# Patient Record
Sex: MALE | Race: BLACK OR AFRICAN AMERICAN | NOT HISPANIC OR LATINO | URBAN - METROPOLITAN AREA
[De-identification: names, ages, dates, MRNs, and addresses within clinical notes are randomized per-mention and may not be internally consistent; named-entity substitution may affect disease eponyms.]

---

## 2017-07-14 ENCOUNTER — OFFICE (AMBULATORY)
Dept: URBAN - METROPOLITAN AREA CLINIC 64 | Facility: CLINIC | Age: 57
End: 2017-07-14
Payer: COMMERCIAL

## 2017-07-14 ENCOUNTER — ON CAMPUS - OUTPATIENT (AMBULATORY)
Dept: URBAN - METROPOLITAN AREA HOSPITAL 2 | Facility: HOSPITAL | Age: 57
End: 2017-07-14
Payer: COMMERCIAL

## 2017-07-14 VITALS
HEART RATE: 76 BPM | SYSTOLIC BLOOD PRESSURE: 94 MMHG | DIASTOLIC BLOOD PRESSURE: 77 MMHG | OXYGEN SATURATION: 100 % | HEART RATE: 68 BPM | SYSTOLIC BLOOD PRESSURE: 107 MMHG | HEART RATE: 65 BPM | TEMPERATURE: 98 F | HEART RATE: 61 BPM | HEIGHT: 69 IN | OXYGEN SATURATION: 97 % | SYSTOLIC BLOOD PRESSURE: 128 MMHG | OXYGEN SATURATION: 96 % | HEART RATE: 60 BPM | DIASTOLIC BLOOD PRESSURE: 64 MMHG | RESPIRATION RATE: 18 BRPM | DIASTOLIC BLOOD PRESSURE: 63 MMHG | DIASTOLIC BLOOD PRESSURE: 78 MMHG | HEART RATE: 55 BPM | OXYGEN SATURATION: 98 % | DIASTOLIC BLOOD PRESSURE: 69 MMHG | DIASTOLIC BLOOD PRESSURE: 72 MMHG | SYSTOLIC BLOOD PRESSURE: 97 MMHG | OXYGEN SATURATION: 99 % | SYSTOLIC BLOOD PRESSURE: 108 MMHG | HEART RATE: 72 BPM | SYSTOLIC BLOOD PRESSURE: 101 MMHG | SYSTOLIC BLOOD PRESSURE: 110 MMHG | WEIGHT: 150 LBS | DIASTOLIC BLOOD PRESSURE: 67 MMHG | HEART RATE: 67 BPM | DIASTOLIC BLOOD PRESSURE: 76 MMHG | RESPIRATION RATE: 16 BRPM | DIASTOLIC BLOOD PRESSURE: 81 MMHG | SYSTOLIC BLOOD PRESSURE: 95 MMHG | HEART RATE: 89 BPM

## 2017-07-14 DIAGNOSIS — K63.5 POLYP OF COLON: ICD-10-CM

## 2017-07-14 DIAGNOSIS — Z86.010 PERSONAL HISTORY OF COLONIC POLYPS: ICD-10-CM

## 2017-07-14 DIAGNOSIS — K57.30 DIVERTICULOSIS OF LARGE INTESTINE WITHOUT PERFORATION OR ABS: ICD-10-CM

## 2017-07-14 PROBLEM — D12.5 BENIGN NEOPLASM OF SIGMOID COLON: Status: ACTIVE | Noted: 2017-07-14

## 2017-07-14 LAB
GI HISTOLOGY: A. UNSPECIFIED: (no result)
GI HISTOLOGY: PDF REPORT: (no result)

## 2017-07-14 PROCEDURE — 88305 TISSUE EXAM BY PATHOLOGIST: CPT | Performed by: INTERNAL MEDICINE

## 2017-07-14 PROCEDURE — 45385 COLONOSCOPY W/LESION REMOVAL: CPT | Mod: 33 | Performed by: INTERNAL MEDICINE

## 2017-07-14 RX ADMIN — PROPOFOL: 10 INJECTION, EMULSION INTRAVENOUS at 11:24

## 2022-06-06 ENCOUNTER — HOSPITAL ENCOUNTER (OUTPATIENT)
Dept: OTHER | Facility: HOSPITAL | Age: 62
Discharge: HOME | End: 2022-06-06

## 2022-09-12 NOTE — PROGRESS NOTES
REFERRING PROVIDER:    Stephania Andrade, DARELL  [address] [place] [address]    REASON FOR CONSULTATION: Normocytic anemia    HISTORY OF PRESENT ILLNESS:  Yazan Sheriff is a 62 y.o. male who is referred today for further evaluation of normocytic anemia.  He is here today for a second opinion.    Labs on 7/27/2022: Creatinine 1.30.  Vitamin B12 265, ferritin 90, hemoglobin 8.2 with hematocrit 25.4%.  MCV 94.4.  White blood cells 4.4 and platelets 264,000.  Reticulocytes 2.2%.    History of recurrent iron deficiency anemia followed by Dr. Jesse Oviedo in [place].  He has required intravenous iron.  Also history of IgG lambda monoclonal gammopathy.  The M spike in May 2020 was 1 g/dL.  This had increased from 0.5 g from February 2019.  Concern for GI bleeding with negative endoscopy.  He is anticoagulated with Eliquis 5 mg twice daily for cardiac reasons.  He had a Mediport placed in January 2022.    He states a couple of months ago he had an EGD and colonoscopy in [place] that did not show source of bleeding.  He notes dark stool but not melenic stool.    He states that this issue has been going on for several years.  He states he required multiple IV iron infusions over the past few months as prescribed by Dr. Oviedo.    He states he has not had his small bowel evaluated.  He denies any other bleeding.  He notes ongoing fatigue and tiredness which does improve after IV iron infusions.  He did get a Mediport due to poor venous access and he is glad he did this.    Occasionally he has some left chest pain when he is more anemic.  Otherwise he remains active.  He hunts and fishes.    No family history of blood disorders.  He denies any other hematologic issues.  He denies HIV risk factors.    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       Past Surgical History:   Procedure Laterality Date   • CARDIAC CATHETERIZATION  06/2021    with coil placement   • CARPAL TUNNEL RELEASE Right    • CHOLECYSTECTOMY     • COLONOSCOPY  12/2018    Benign per patient.   • ENDOSCOPY     • LEG REVASCULARIZATION Right 04/2021    Right lower extremity revascularization   • MEDIPORT INSERTION, SINGLE  01/2022   • OTHER SURGICAL HISTORY  08/2020    Stenting aortic arch for aortic aneurysm and thrombosed celiac artery aneurysm   • OTHER SURGICAL HISTORY  02/2020    Saphenous harvest and stent placement in thoracic aorta   • ROTATOR CUFF REPAIR Right    • VASCULAR SURGERY Right 2020    Right lower extremity vascular surgery revascularization from compartment syndrome       SOCIAL HISTORY:   reports that he quit smoking about 2 years ago. He smoked 0.50 packs per day. He has never used smokeless tobacco. He reports that he does not drink alcohol. No history on file for drug use.    FAMILY HISTORY:  family history includes Colon cancer in his mother; Stomach cancer in his father; Stroke in his mother.    ALLERGIES:  No Known Allergies    MEDICATIONS:  The medication list has been reviewed with the patient by the medical assistant, and the list has been updated in the electronic medical record, which I reviewed.  Medication dosages and frequencies were confirmed to be accurate.    Review of Systems   Constitutional: Positive for fatigue.   All other systems reviewed and are negative.      PHYSICAL EXAMINATION:  Vitals:    09/15/22 0853   BP: 161/95   Pulse: 63   Resp: 16   Temp: 97.8 °F (36.6 °C)   TempSrc: Temporal   SpO2: 100%   Weight: 63.3 kg (139 lb 8 oz)   Height: 176.5 cm (69.5\")   PainSc: 0-No pain       Physical " Exam  Vitals and nursing note reviewed.   Constitutional:       General: He is not in acute distress.     Appearance: He is well-developed.   HENT:      Head: Normocephalic and atraumatic. Hair is normal.   Eyes:      General: Lids are normal.      Conjunctiva/sclera: Conjunctivae normal.      Pupils: Pupils are equal.   Neck:      Thyroid: No thyroid mass or thyromegaly.      Vascular: No JVD.   Cardiovascular:      Rate and Rhythm: Normal rate and regular rhythm.      Heart sounds: No murmur heard.    No friction rub. No gallop.   Pulmonary:      Effort: Pulmonary effort is normal. No respiratory distress.      Breath sounds: Normal breath sounds. No wheezing or rales.   Chest:   Breasts:      Right: No supraclavicular adenopathy.      Left: No supraclavicular adenopathy.        Comments: Benign-appearing Mediport present in the right subclavian area  Abdominal:      General: There is no distension.      Palpations: Abdomen is soft. There is no hepatomegaly, splenomegaly or mass.      Tenderness: There is no abdominal tenderness. There is no guarding.   Musculoskeletal:         General: No tenderness or deformity. Normal range of motion.      Cervical back: Neck supple.   Lymphadenopathy:      Cervical: No cervical adenopathy.      Upper Body:      Right upper body: No supraclavicular adenopathy.      Left upper body: No supraclavicular adenopathy.   Skin:     General: Skin is warm and dry.      Findings: No rash.   Neurological:      Mental Status: He is alert and oriented to person, place, and time.   Psychiatric:         Behavior: Behavior normal.         Thought Content: Thought content normal.         Judgment: Judgment normal.         DIAGNOSTIC DATA:  CBC & Differential (09/15/2022 08:48)      IMAGING:    I personally reviewed the peripheral blood smear.  Platelets adequate in number normal morphology.  Anisocytosis and poikilocytosis present.  Occasional schistocytes.  No obvious white cell  abnormalities.    ASSESSMENT:  This is a 62 y.o. male with:    *Normocytic anemia, apparent iron refractory iron deficiency anemia  · CBC dating back to 12/31/2014 with a white blood cell count of 7.29, hemoglobin 12.5, platelets 252,000  · Labs on 7/27/2022: Creatinine 1.30.  Vitamin B12 265, ferritin 90, hemoglobin 8.2 with hematocrit 25.4%.  MCV 94.4.  White blood cells 4.4 and platelets 264,000.  Reticulocytes 2.2%.  · 9/15/2022: Initial consultation.  Hemoglobin 8.7 with hematocrit 26.7%.  MCV normal at 96.  Normal white blood cell count of 4.94 with mild lymphocytopenia with an absolute lymphocyte count of 0.54.  Platelets normal at 227,000.  · Followed by Dr. Jesse Oviedo in [place].  He has required multiple IV iron infusions and his anemia has been refractory to this.  · Recent EGD and colonoscopy apparently without a source of bleeding.  He does have dark/black stool but not melenic stool.  Suspicion for GI blood loss.  · He states that he has not had a small bowel evaluated  · He may have some chronic kidney disease which is contributing as well.    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 Laboratory evaluation today for both anemia and cytopenias noted below.  We will access his Mediport for the labs today.  2. I will request EGD and colonoscopy records from past couple of months.  It does not sound like he has had a small bowel evaluated.  Therefore, refer to gastroenterology in [place] for capsule endoscopy.  3. I do not  think his renal function is poor enough to consider Procrit  4. The only other thing to do to evaluate the anemia would be to consider a bone marrow aspiration and biopsy  5. For now, I will follow-up laboratory results.  He plans to follow-up with Dr. Jesse Oviedo in [place] next month.    ORDERS PLACED DURING THIS ENCOUNTER  Orders Placed This Encounter   Procedures   • Comprehensive Metabolic Panel     Order Specific Question:   Release to patient     Answer:   Routine Release   • Ferritin     Order Specific Question:   Is the patient on iron therapy?     Answer:   Yes     Order Specific Question:   Release to patient     Answer:   Routine Release   • Iron Profile     Order Specific Question:   Is the patient on iron therapy?     Answer:   Yes     Order Specific Question:   Release to patient     Answer:   Routine Release   • Retic With IRF & RET-He     Order Specific Question:   Release to patient     Answer:   Routine Release   • GILBERTO,PE and FLC, Serum     Order Specific Question:   Release to patient     Answer:   Routine Release   • Vitamin B12     Order Specific Question:   Release to patient     Answer:   Routine Release   • Folate RBC     Order Specific Question:   Release to patient     Answer:   Routine Release   • Flow Cytometry, Blood     Order Specific Question:   Release to patient     Answer:   Routine Release   • HIV-1 / O / 2 Ag / Antibody 4th Generation     Order Specific Question:   Release to patient     Answer:   Routine Release   • Lactate Dehydrogenase     Order Specific Question:   Release to patient     Answer:   Routine Release   • Sedimentation Rate     Order Specific Question:   Release to patient     Answer:   Routine Release   • Erythropoietin     Order Specific Question:   Release to patient     Answer:   Routine Release   • Haptoglobin     Order Specific Question:   Release to patient     Answer:   Routine Release   • Folate     Order Specific Question:   Release to patient      Answer:   Routine Release   • Direct Antiglobulin Test (Direct Yosvany)     Order Specific Question:   Release to patient     Answer:   Routine Release   • CBC & Differential     Standing Status:   Future     Number of Occurrences:   1     Standing Expiration Date:   9/12/2023     Order Specific Question:   Manual Differential     Answer:   No     Order Specific Question:   Release to patient     Answer:   Routine Release

## 2022-09-15 ENCOUNTER — CONSULT (OUTPATIENT)
Dept: ONCOLOGY | Facility: CLINIC | Age: 62
End: 2022-09-15

## 2022-09-15 ENCOUNTER — INFUSION (OUTPATIENT)
Dept: ONCOLOGY | Facility: HOSPITAL | Age: 62
End: 2022-09-15

## 2022-09-15 ENCOUNTER — LAB (OUTPATIENT)
Dept: LAB | Facility: HOSPITAL | Age: 62
End: 2022-09-15

## 2022-09-15 VITALS
RESPIRATION RATE: 16 BRPM | DIASTOLIC BLOOD PRESSURE: 95 MMHG | HEART RATE: 63 BPM | OXYGEN SATURATION: 100 % | TEMPERATURE: 97.8 F | HEIGHT: 70 IN | SYSTOLIC BLOOD PRESSURE: 161 MMHG | BODY MASS INDEX: 19.97 KG/M2 | WEIGHT: 139.5 LBS

## 2022-09-15 DIAGNOSIS — D50.9 IRON DEFICIENCY ANEMIA, UNSPECIFIED IRON DEFICIENCY ANEMIA TYPE: ICD-10-CM

## 2022-09-15 DIAGNOSIS — Z45.2 FITTING AND ADJUSTMENT OF VASCULAR CATHETER: Primary | ICD-10-CM

## 2022-09-15 DIAGNOSIS — D50.9 IRON DEFICIENCY ANEMIA, UNSPECIFIED IRON DEFICIENCY ANEMIA TYPE: Primary | ICD-10-CM

## 2022-09-15 DIAGNOSIS — D72.810 LYMPHOCYTOPENIA: ICD-10-CM

## 2022-09-15 LAB
ALBUMIN SERPL-MCNC: 4.3 G/DL (ref 3.5–5.2)
ALBUMIN/GLOB SERPL: 1.4 G/DL (ref 1.1–2.4)
ALP SERPL-CCNC: 77 U/L (ref 38–116)
ALT SERPL W P-5'-P-CCNC: 9 U/L (ref 0–41)
ANION GAP SERPL CALCULATED.3IONS-SCNC: 11.4 MMOL/L (ref 5–15)
AST SERPL-CCNC: 15 U/L (ref 0–40)
BASOPHILS # BLD AUTO: 0.02 10*3/MM3 (ref 0–0.2)
BASOPHILS NFR BLD AUTO: 0.4 % (ref 0–1.5)
BILIRUB SERPL-MCNC: <0.2 MG/DL (ref 0.2–1.2)
BUN SERPL-MCNC: 21 MG/DL (ref 6–20)
BUN/CREAT SERPL: 15.1 (ref 7.3–30)
CALCIUM SPEC-SCNC: 9.4 MG/DL (ref 8.5–10.2)
CHLORIDE SERPL-SCNC: 107 MMOL/L (ref 98–107)
CO2 SERPL-SCNC: 23.6 MMOL/L (ref 22–29)
CREAT SERPL-MCNC: 1.39 MG/DL (ref 0.7–1.3)
DAT POLY-SP REAG RBC QL: NEGATIVE
DEPRECATED RDW RBC AUTO: 50.8 FL (ref 37–54)
EGFRCR SERPLBLD CKD-EPI 2021: 57 ML/MIN/1.73
EOSINOPHIL # BLD AUTO: 0.07 10*3/MM3 (ref 0–0.4)
EOSINOPHIL NFR BLD AUTO: 1.4 % (ref 0.3–6.2)
ERYTHROCYTE [DISTWIDTH] IN BLOOD BY AUTOMATED COUNT: 14.5 % (ref 12.3–15.4)
ERYTHROCYTE [SEDIMENTATION RATE] IN BLOOD: 17 MM/HR (ref 0–20)
FERRITIN SERPL-MCNC: 153.3 NG/ML (ref 30–400)
FOLATE SERPL-MCNC: 5.3 NG/ML (ref 4.78–24.2)
GLOBULIN UR ELPH-MCNC: 3 GM/DL (ref 1.8–3.5)
GLUCOSE SERPL-MCNC: 108 MG/DL (ref 74–124)
HAPTOGLOB SERPL-MCNC: 105 MG/DL (ref 30–200)
HCT VFR BLD AUTO: 26.7 % (ref 37.5–51)
HGB BLD-MCNC: 8.7 G/DL (ref 13–17.7)
HGB RETIC QN AUTO: 32.5 PG (ref 29.8–36.1)
HIV1+2 AB SER QL: NORMAL
IMM GRANULOCYTES # BLD AUTO: 0.03 10*3/MM3 (ref 0–0.05)
IMM GRANULOCYTES NFR BLD AUTO: 0.6 % (ref 0–0.5)
IMM RETICS NFR: 19.7 % (ref 3–15.8)
IRON 24H UR-MRATE: 51 MCG/DL (ref 59–158)
IRON SATN MFR SERPL: 18 % (ref 14–48)
LDH SERPL-CCNC: 159 U/L (ref 99–259)
LYMPHOCYTES # BLD AUTO: 0.54 10*3/MM3 (ref 0.7–3.1)
LYMPHOCYTES NFR BLD AUTO: 10.9 % (ref 19.6–45.3)
MCH RBC QN AUTO: 31.3 PG (ref 26.6–33)
MCHC RBC AUTO-ENTMCNC: 32.6 G/DL (ref 31.5–35.7)
MCV RBC AUTO: 96 FL (ref 79–97)
MONOCYTES # BLD AUTO: 0.26 10*3/MM3 (ref 0.1–0.9)
MONOCYTES NFR BLD AUTO: 5.3 % (ref 5–12)
NEUTROPHILS NFR BLD AUTO: 4.02 10*3/MM3 (ref 1.7–7)
NEUTROPHILS NFR BLD AUTO: 81.4 % (ref 42.7–76)
NRBC BLD AUTO-RTO: 0 /100 WBC (ref 0–0.2)
PLATELET # BLD AUTO: 227 10*3/MM3 (ref 140–450)
PMV BLD AUTO: 10.4 FL (ref 6–12)
POTASSIUM SERPL-SCNC: 4.4 MMOL/L (ref 3.5–4.7)
PROT SERPL-MCNC: 7.3 G/DL (ref 6.3–8)
RBC # BLD AUTO: 2.78 10*6/MM3 (ref 4.14–5.8)
RETICS # AUTO: 0.1 10*6/MM3 (ref 0.02–0.13)
RETICS/RBC NFR AUTO: 3.68 % (ref 0.7–1.9)
SODIUM SERPL-SCNC: 142 MMOL/L (ref 134–145)
TIBC SERPL-MCNC: 276 MCG/DL (ref 249–505)
TRANSFERRIN SERPL-MCNC: 197 MG/DL (ref 200–360)
VIT B12 BLD-MCNC: 265 PG/ML (ref 211–946)
WBC NRBC COR # BLD: 4.94 10*3/MM3 (ref 3.4–10.8)

## 2022-09-15 PROCEDURE — 82746 ASSAY OF FOLIC ACID SERUM: CPT | Performed by: INTERNAL MEDICINE

## 2022-09-15 PROCEDURE — 83540 ASSAY OF IRON: CPT | Performed by: INTERNAL MEDICINE

## 2022-09-15 PROCEDURE — 96523 IRRIG DRUG DELIVERY DEVICE: CPT

## 2022-09-15 PROCEDURE — 85025 COMPLETE CBC W/AUTO DIFF WBC: CPT

## 2022-09-15 PROCEDURE — 36415 COLL VENOUS BLD VENIPUNCTURE: CPT

## 2022-09-15 PROCEDURE — 99204 OFFICE O/P NEW MOD 45 MIN: CPT | Performed by: INTERNAL MEDICINE

## 2022-09-15 PROCEDURE — 85046 RETICYTE/HGB CONCENTRATE: CPT | Performed by: INTERNAL MEDICINE

## 2022-09-15 PROCEDURE — 83615 LACTATE (LD) (LDH) ENZYME: CPT | Performed by: INTERNAL MEDICINE

## 2022-09-15 PROCEDURE — 82728 ASSAY OF FERRITIN: CPT | Performed by: INTERNAL MEDICINE

## 2022-09-15 PROCEDURE — 25010000002 HEPARIN LOCK FLUSH PER 10 UNITS: Performed by: INTERNAL MEDICINE

## 2022-09-15 PROCEDURE — 84466 ASSAY OF TRANSFERRIN: CPT | Performed by: INTERNAL MEDICINE

## 2022-09-15 PROCEDURE — 82607 VITAMIN B-12: CPT | Performed by: INTERNAL MEDICINE

## 2022-09-15 PROCEDURE — G0432 EIA HIV-1/HIV-2 SCREEN: HCPCS | Performed by: INTERNAL MEDICINE

## 2022-09-15 PROCEDURE — 80053 COMPREHEN METABOLIC PANEL: CPT | Performed by: INTERNAL MEDICINE

## 2022-09-15 PROCEDURE — 86880 COOMBS TEST DIRECT: CPT | Performed by: INTERNAL MEDICINE

## 2022-09-15 PROCEDURE — 88182 CELL MARKER STUDY: CPT

## 2022-09-15 PROCEDURE — 83010 ASSAY OF HAPTOGLOBIN QUANT: CPT | Performed by: INTERNAL MEDICINE

## 2022-09-15 PROCEDURE — 85652 RBC SED RATE AUTOMATED: CPT | Performed by: INTERNAL MEDICINE

## 2022-09-15 PROCEDURE — 36591 DRAW BLOOD OFF VENOUS DEVICE: CPT

## 2022-09-15 PROCEDURE — 88185 FLOWCYTOMETRY/TC ADD-ON: CPT

## 2022-09-15 PROCEDURE — 88184 FLOWCYTOMETRY/ TC 1 MARKER: CPT

## 2022-09-15 RX ORDER — SODIUM CHLORIDE 0.9 % (FLUSH) 0.9 %
10 SYRINGE (ML) INJECTION AS NEEDED
Status: CANCELLED | OUTPATIENT
Start: 2022-09-15

## 2022-09-15 RX ORDER — TERAZOSIN 5 MG/1
5 CAPSULE ORAL DAILY
COMMUNITY
Start: 2022-07-15

## 2022-09-15 RX ORDER — HEPARIN SODIUM (PORCINE) LOCK FLUSH IV SOLN 100 UNIT/ML 100 UNIT/ML
500 SOLUTION INTRAVENOUS AS NEEDED
Status: CANCELLED | OUTPATIENT
Start: 2022-09-15

## 2022-09-15 RX ORDER — METHYLPREDNISOLONE 4 MG/1
TABLET ORAL
COMMUNITY
Start: 2022-09-14 | End: 2022-10-26

## 2022-09-15 RX ORDER — ASPIRIN 81 MG/1
81 TABLET, CHEWABLE ORAL DAILY
COMMUNITY

## 2022-09-15 RX ORDER — HYDROCODONE BITARTRATE AND ACETAMINOPHEN 10; 325 MG/1; MG/1
1 TABLET ORAL EVERY 4 HOURS PRN
COMMUNITY
Start: 2022-08-25

## 2022-09-15 RX ORDER — LISINOPRIL AND HYDROCHLOROTHIAZIDE 20; 12.5 MG/1; MG/1
1 TABLET ORAL DAILY
COMMUNITY
Start: 2013-12-09

## 2022-09-15 RX ORDER — AMLODIPINE BESYLATE 5 MG/1
5 TABLET ORAL DAILY
COMMUNITY
Start: 2022-07-21

## 2022-09-15 RX ORDER — GABAPENTIN 300 MG/1
300 CAPSULE ORAL AS NEEDED
COMMUNITY
Start: 2014-01-21

## 2022-09-15 RX ORDER — LOSARTAN POTASSIUM 100 MG/1
100 TABLET ORAL DAILY
COMMUNITY
Start: 2022-07-18

## 2022-09-15 RX ORDER — PANTOPRAZOLE SODIUM 20 MG/1
20 TABLET, DELAYED RELEASE ORAL DAILY
COMMUNITY
End: 2023-03-27

## 2022-09-15 RX ORDER — SODIUM CHLORIDE 0.9 % (FLUSH) 0.9 %
10 SYRINGE (ML) INJECTION AS NEEDED
OUTPATIENT
Start: 2022-09-15

## 2022-09-15 RX ORDER — HEPARIN SODIUM (PORCINE) LOCK FLUSH IV SOLN 100 UNIT/ML 100 UNIT/ML
500 SOLUTION INTRAVENOUS AS NEEDED
OUTPATIENT
Start: 2022-09-15

## 2022-09-15 RX ORDER — SODIUM CHLORIDE 0.9 % (FLUSH) 0.9 %
10 SYRINGE (ML) INJECTION AS NEEDED
Status: DISCONTINUED | OUTPATIENT
Start: 2022-09-15 | End: 2022-09-15 | Stop reason: HOSPADM

## 2022-09-15 RX ORDER — HEPARIN SODIUM (PORCINE) LOCK FLUSH IV SOLN 100 UNIT/ML 100 UNIT/ML
500 SOLUTION INTRAVENOUS AS NEEDED
Status: DISCONTINUED | OUTPATIENT
Start: 2022-09-15 | End: 2022-09-15 | Stop reason: HOSPADM

## 2022-09-15 RX ORDER — APIXABAN 5 MG/1
5 TABLET, FILM COATED ORAL 2 TIMES DAILY
COMMUNITY
Start: 2022-08-01

## 2022-09-15 RX ADMIN — Medication 10 ML: at 10:12

## 2022-09-15 RX ADMIN — Medication 500 UNITS: at 10:12

## 2022-09-15 NOTE — PROGRESS NOTES
Labs drawn venipuncture. Per Dr. Dow patient can get port flush today and draw add on labs from port.

## 2022-09-16 LAB
ALBUMIN SERPL ELPH-MCNC: 3.8 G/DL (ref 2.9–4.4)
ALBUMIN/GLOB SERPL: 1.3 {RATIO} (ref 0.7–1.7)
ALPHA1 GLOB SERPL ELPH-MCNC: 0.3 G/DL (ref 0–0.4)
ALPHA2 GLOB SERPL ELPH-MCNC: 0.6 G/DL (ref 0.4–1)
B-GLOBULIN SERPL ELPH-MCNC: 0.9 G/DL (ref 0.7–1.3)
EPO SERPL-ACNC: 62.1 MIU/ML (ref 2.6–18.5)
GAMMA GLOB SERPL ELPH-MCNC: 1.2 G/DL (ref 0.4–1.8)
GLOBULIN SER-MCNC: 3 G/DL (ref 2.2–3.9)
IGA SERPL-MCNC: 225 MG/DL (ref 61–437)
IGG SERPL-MCNC: 1195 MG/DL (ref 603–1613)
IGM SERPL-MCNC: 25 MG/DL (ref 20–172)
INTERPRETATION SERPL IEP-IMP: ABNORMAL
KAPPA LC FREE SER-MCNC: 28 MG/L (ref 3.3–19.4)
KAPPA LC FREE/LAMBDA FREE SER: 0.22 {RATIO} (ref 0.26–1.65)
LABORATORY COMMENT REPORT: ABNORMAL
LAMBDA LC FREE SERPL-MCNC: 130 MG/L (ref 5.7–26.3)
M PROTEIN SERPL ELPH-MCNC: 0.6 G/DL
PROT SERPL-MCNC: 6.8 G/DL (ref 6–8.5)
REF LAB TEST METHOD: NORMAL

## 2022-09-19 ENCOUNTER — OFFICE VISIT (OUTPATIENT)
Dept: GASTROENTEROLOGY | Facility: CLINIC | Age: 62
End: 2022-09-19

## 2022-09-19 VITALS — BODY MASS INDEX: 20.71 KG/M2 | TEMPERATURE: 98 F | HEIGHT: 69 IN | WEIGHT: 139.8 LBS

## 2022-09-19 DIAGNOSIS — R12 HEARTBURN: ICD-10-CM

## 2022-09-19 DIAGNOSIS — R19.5 DARK STOOLS: ICD-10-CM

## 2022-09-19 DIAGNOSIS — R63.4 WEIGHT LOSS: ICD-10-CM

## 2022-09-19 DIAGNOSIS — D50.9 IRON DEFICIENCY ANEMIA, UNSPECIFIED IRON DEFICIENCY ANEMIA TYPE: Primary | ICD-10-CM

## 2022-09-19 PROCEDURE — 99204 OFFICE O/P NEW MOD 45 MIN: CPT | Performed by: NURSE PRACTITIONER

## 2022-09-19 NOTE — PROGRESS NOTES
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

## 2022-09-19 NOTE — PROGRESS NOTES
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  Duodenal biopsy came back with duodenitis.Patient Name: Yazan Sheriff   Visit Date: 09/19/2022   Patient ID: [id number]  Provider: DARELL Tucker    Sex: male  Location:  Location Address:  Location Phone: [address] [address] [phone]    YOB: 1960      Primary Care Provider Stephania Andrade APRN      Referring Provider: DARELL Vargas        Chief Complaint  Anemia (NEW PATIENT, ANEMIA    Iron deficiency had a colonoscopy in July at [facility])    History of Present Illness  Yazan Sheriff is a 62 y.o. who presents to [facility] on referral from DARELL Vargas for a gastroenterology evaluation of Anemia (NEW PATIENT, ANEMIA    Iron deficiency had a colonoscopy in July at [facility]).    Mr. Sheriff reports iron deficiency for the last 4 years. Has had to have multiple iron infusions, 8 in just the last 2 months. 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  Biopsy came back with duodenitis.  Iron continues to be dropping.  He is currently seeing hematology and they referred him for a capsule endoscopy.    Hx of aortic aneurysm in 2020. Taking eliquis daily and managed by DARELL Coronado.      Stool recently has changed to \"a dark color\", \"close to black\". Bowel movement at least once daily. Stool is formed. Denies any hematochezia or abdominal pain. H/o colon cancer in Mother.     Occasional heartburn. Denies any nausea, vomiting, or dysphagia. Appetite and weight stable. Eating well but has lost 20 lb in the last few years. Admits to smoking marijuana daily.     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       Past Surgical History:   Procedure Laterality Date   • CARDIAC CATHETERIZATION  06/2021    with coil placement   • CARPAL TUNNEL RELEASE Right    • CHOLECYSTECTOMY     • COLONOSCOPY  12/2018    Benign per patient.   • ENDOSCOPY     • LEG REVASCULARIZATION Right 04/2021    Right lower extremity revascularization   • MEDIPORT INSERTION, SINGLE  01/2022   • OTHER SURGICAL HISTORY  08/2020    Stenting aortic arch for aortic aneurysm and thrombosed celiac artery aneurysm   • OTHER SURGICAL HISTORY  02/2020    Saphenous harvest and stent placement in thoracic aorta   • ROTATOR CUFF REPAIR Right    • VASCULAR SURGERY Right 2020    Right lower extremity vascular surgery revascularization from compartment syndrome         Current Outpatient Medications:   •  amLODIPine (NORVASC) 5 MG tablet, Take 5 mg by mouth Daily., Disp: , Rfl:   •  aspirin 81 MG chewable tablet, Chew., Disp: , Rfl:   •  Eliquis 5 MG tablet tablet, Take 5 mg by mouth 2 (Two) Times a Day., Disp: , Rfl:   •  gabapentin (NEURONTIN) 300 MG capsule, Take  by mouth 2 (Two) Times a Day., Disp: , Rfl:   •  HYDROcodone-acetaminophen (NORCO)  MG per tablet, Take 1 tablet by mouth Every 4 (Four) Hours As Needed. for pain, Disp: , Rfl:   •  lisinopril-hydrochlorothiazide (PRINZIDE,ZESTORETIC) 20-12.5 MG per tablet, Take  by mouth., Disp: , Rfl:   •  losartan (COZAAR) 100 MG tablet, Take 100 mg by mouth Daily., Disp: , Rfl:   •  pantoprazole (PROTONIX) 20 MG EC tablet, Take 20 mg by mouth Daily., Disp: , Rfl:   •  terazosin (HYTRIN) 5 MG capsule, Take  "5 mg by mouth Daily., Disp: , Rfl:   •  methylPREDNISolone (MEDROL) 4 MG dose pack, , Disp: , Rfl:      Allergies   Allergen Reactions   • Clopidogrel Palpitations and Other (See Comments)   • Lactose Nausea And Vomiting and GI Intolerance       Family History   Problem Relation Age of Onset   • Colon cancer Mother    • Stroke Mother    • Stomach cancer Father         Social History     Social History Narrative   • Not on file         Objective     Review of Systems   Constitutional: Positive for fatigue and unexpected weight loss.        Vital Signs:   Temp 98 °F (36.7 °C)   Ht 176.5 cm (69.49\")   Wt 63.4 kg (139 lb 12.8 oz)   BMI 20.36 kg/m²       Physical Exam  Constitutional:       General: He is not in acute distress.     Appearance: Normal appearance. He is well-developed and normal weight.   HENT:      Head: Normocephalic and atraumatic.   Eyes:      Conjunctiva/sclera: Conjunctivae normal.      Pupils: Pupils are equal, round, and reactive to light.      Visual Fields: Right eye visual fields normal and left eye visual fields normal.   Cardiovascular:      Rate and Rhythm: Normal rate and regular rhythm.      Heart sounds: Normal heart sounds.   Pulmonary:      Effort: Pulmonary effort is normal. No retractions.      Breath sounds: Normal breath sounds and air entry.   Abdominal:      General: Bowel sounds are normal. There is no distension.      Palpations: Abdomen is soft.      Tenderness: There is no abdominal tenderness.      Comments: No appreciable hepatosplenomegaly or ascites   Musculoskeletal:         General: Normal range of motion.      Cervical back: Normal range of motion and neck supple.   Skin:     General: Skin is warm and dry.   Neurological:      Mental Status: He is alert and oriented to person, place, and time.   Psychiatric:         Mood and Affect: Mood and affect normal.         Behavior: Behavior normal.         Result Review :   The following data was reviewed by: Etelvina" DARELL Garcia on 09/19/2022:  CBC w/diff    CBC w/Diff 9/15/22   WBC 4.94   RBC 2.78 (A)   Hemoglobin 8.7 (A)   Hematocrit 26.7 (A)   MCV 96.0   MCH 31.3   MCHC 32.6   RDW 14.5   Platelets 227   Neutrophil Rel % 81.4 (A)   Immature Granulocyte Rel % 0.6 (A)   Lymphocyte Rel % 10.9 (A)   Monocyte Rel % 5.3   Eosinophil Rel % 1.4   Basophil Rel % 0.4   (A) Abnormal value            CMP    CMP 9/15/22 9/15/22    1011 1011   Glucose 108    BUN 21 (A)    Creatinine 1.39 (A)    Sodium 142    Potassium 4.4    Chloride 107    Calcium 9.4    Total Protein  6.8   Albumin 4.30 3.8   Globulin  3.0   Total Bilirubin <0.2 (A)    Alkaline Phosphatase 77    AST (SGOT) 15    ALT (SGPT) 9    (A) Abnormal value            Iron   Date Value Ref Range Status   09/15/2022 51 (L) 59 - 158 mcg/dL Final     TIBC   Date Value Ref Range Status   09/15/2022 276 249 - 505 mcg/dL Final     Iron Saturation   Date Value Ref Range Status   09/15/2022 18 14 - 48 % Final     Transferrin   Date Value Ref Range Status   09/15/2022 197 (L) 200 - 360 mg/dL Final     Ferritin   Date Value Ref Range Status   09/15/2022 153.30 30.00 - 400.00 ng/mL Final     Sed Rate   Date Value Ref Range Status   09/15/2022 17 0 - 20 mm/hr Final     IgG   Date Value Ref Range Status   09/15/2022 1195 603 - 1613 mg/dL Final     IgA   Date Value Ref Range Status   09/15/2022 225 61 - 437 mg/dL Final     IgM   Date Value Ref Range Status   09/15/2022 25 20 - 172 mg/dL Final     Comment:     Result confirmed on concentration.             Assessment and Plan    Diagnoses and all orders for this visit:    1. Iron deficiency anemia, unspecified iron deficiency anemia type (Primary)  -     Endoscopy, GI With Capsule; Future    2. Weight loss    3. Heartburn    4. Dark stools      * Surgery not found *     We will proceed with capsule endoscopy however may need to repeat EGD due to history of duodenal arteriovenous malformation.  Capsule endoscopy is scheduled for next  week.    Follow Up   Return if symptoms worsen or fail to improve/TBD based on capsule endoscopy results.  Patient was given instructions and counseling regarding his condition or for health maintenance advice. Please see specific information pulled into the AVS if appropriate.

## 2022-09-21 ENCOUNTER — PROCEDURE VISIT (OUTPATIENT)
Dept: GASTROENTEROLOGY | Facility: CLINIC | Age: 62
End: 2022-09-21

## 2022-09-21 DIAGNOSIS — D50.9 IRON DEFICIENCY ANEMIA, UNSPECIFIED IRON DEFICIENCY ANEMIA TYPE: ICD-10-CM

## 2022-09-21 PROCEDURE — 91110 GI TRC IMG INTRAL ESOPH-ILE: CPT | Performed by: NURSE PRACTITIONER

## 2022-09-21 NOTE — PROGRESS NOTES
Procedure   Endoscopy, GI With Capsule    Date/Time: 9/21/2022 8:23 AM  Performed by: Etelvina Garcia APRN  Authorized by: Etelvina Garcia APRN   Local anesthesia used: no    Anesthesia:  Local anesthesia used: no    Sedation:  Patient sedated: no    Patient tolerance: patient tolerated the procedure well with no immediate complications

## 2022-09-22 LAB
FOLATE BLD-MCNC: 253 NG/ML
FOLATE RBC-MCNC: 900 NG/ML
HCT VFR BLD AUTO: 28.1 % (ref 37.5–51)

## 2022-10-10 ENCOUNTER — PREP FOR SURGERY (OUTPATIENT)
Dept: OTHER | Facility: HOSPITAL | Age: 62
End: 2022-10-10

## 2022-10-10 ENCOUNTER — TELEPHONE (OUTPATIENT)
Dept: GASTROENTEROLOGY | Facility: CLINIC | Age: 62
End: 2022-10-10

## 2022-10-10 DIAGNOSIS — K31.819 ANGIODYSPLASIA OF DUODENUM: Primary | ICD-10-CM

## 2022-10-10 NOTE — TELEPHONE ENCOUNTER
----- Message from DARELL Chi sent at 10/10/2022  1:14 PM EDT -----  Please let pt. Know that capsule reveals bleeding in upper portion of gi tract.  Please schedule patient for EGD with push enteroscopy on 10/17.    ----- Message -----  From: Chantelle Diggs MD  Sent: 10/10/2022   1:11 PM EDT  To: DARELL Chi    Yes, ok to schedule for push enteroscopy.  Can add to 10/17.  thanks  ----- Message -----  From: Ida Plascencia APRN  Sent: 10/5/2022   4:29 PM EDT  To: Chantelle Diggs MD    Please review capsule.  Do you think he would benefit from EGD with push enteroscopy?  ----- Message -----  From: Maribel Fine MA  Sent: 9/21/2022   8:24 AM EDT  To: DARELL Tucker

## 2022-10-12 PROBLEM — K31.819 ANGIODYSPLASIA OF DUODENUM: Status: ACTIVE | Noted: 2022-10-12

## 2022-10-12 NOTE — TELEPHONE ENCOUNTER
Jami is on eliquis prescribed from vascular  Dr John [facility] and Stephania Andrade follows.     Pt refused 10/17 due to having to fast until 1300.     Patient has been scheduled for 10/27/22 with an arrival time 0900. Pt aware of date, time, location and instructions.

## 2022-10-17 ENCOUNTER — TELEPHONE (OUTPATIENT)
Dept: GASTROENTEROLOGY | Facility: CLINIC | Age: 62
End: 2022-10-17

## 2022-10-17 NOTE — TELEPHONE ENCOUNTER
Attempted to contact patients PCP Stephania Andrade in regards to blood thinner clearance needed for upcoming procedure on 10/27/2022. Patient is on Eliquis. Left voicemail for MA. Will follow up.

## 2022-10-20 NOTE — TELEPHONE ENCOUNTER
Received faxed approval from Stephania Andrade for patient to hold Eliquis 48 hours prior. Scanned in letter to media.

## 2022-10-27 ENCOUNTER — ANESTHESIA EVENT (OUTPATIENT)
Dept: GASTROENTEROLOGY | Facility: HOSPITAL | Age: 62
End: 2022-10-27

## 2022-10-27 ENCOUNTER — TELEPHONE (OUTPATIENT)
Dept: GASTROENTEROLOGY | Facility: CLINIC | Age: 62
End: 2022-10-27

## 2022-10-27 ENCOUNTER — HOSPITAL ENCOUNTER (OUTPATIENT)
Facility: HOSPITAL | Age: 62
Setting detail: HOSPITAL OUTPATIENT SURGERY
Discharge: HOME | End: 2022-10-27
Attending: INTERNAL MEDICINE | Admitting: INTERNAL MEDICINE

## 2022-10-27 ENCOUNTER — ANESTHESIA (OUTPATIENT)
Dept: GASTROENTEROLOGY | Facility: HOSPITAL | Age: 62
End: 2022-10-27

## 2022-10-27 VITALS
TEMPERATURE: 97.2 F | HEART RATE: 109 BPM | RESPIRATION RATE: 16 BRPM | OXYGEN SATURATION: 99 % | SYSTOLIC BLOOD PRESSURE: 171 MMHG | BODY MASS INDEX: 20.7 KG/M2 | WEIGHT: 139.77 LBS | DIASTOLIC BLOOD PRESSURE: 99 MMHG | HEIGHT: 69 IN

## 2022-10-27 PROCEDURE — 44369 SMALL BOWEL ENDOSCOPY: CPT | Performed by: INTERNAL MEDICINE

## 2022-10-27 PROCEDURE — 25010000002 PROCHLORPERAZINE 10 MG/2ML SOLUTION: Performed by: ANESTHESIOLOGY

## 2022-10-27 PROCEDURE — 25010000002 GLUCAGON (HUMAN RECOMBINANT) 1 MG RECONSTITUTED SOLUTION: Performed by: NURSE ANESTHETIST, CERTIFIED REGISTERED

## 2022-10-27 PROCEDURE — 25010000002 HEPARIN LOCK FLUSH PER 10 UNITS: Performed by: ANESTHESIOLOGY

## 2022-10-27 PROCEDURE — 25010000002 HYDRALAZINE PER 20 MG: Performed by: ANESTHESIOLOGY

## 2022-10-27 PROCEDURE — 25010000002 PROPOFOL 10 MG/ML EMULSION: Performed by: NURSE ANESTHETIST, CERTIFIED REGISTERED

## 2022-10-27 PROCEDURE — 25010000002 HYDRALAZINE PER 20 MG: Performed by: NURSE ANESTHETIST, CERTIFIED REGISTERED

## 2022-10-27 DEVICE — DEV CLIP ENDO RESOLUTION360 CONTRL ROT 235CM: Type: IMPLANTABLE DEVICE | Site: DUODENUM | Status: FUNCTIONAL

## 2022-10-27 RX ORDER — SODIUM CHLORIDE 0.9 % (FLUSH) 0.9 %
20 SYRINGE (ML) INJECTION AS NEEDED
Status: DISCONTINUED | OUTPATIENT
Start: 2022-10-27 | End: 2022-10-27 | Stop reason: HOSPADM

## 2022-10-27 RX ORDER — GLYCOPYRROLATE 0.2 MG/ML
INJECTION INTRAMUSCULAR; INTRAVENOUS AS NEEDED
Status: DISCONTINUED | OUTPATIENT
Start: 2022-10-27 | End: 2022-10-27 | Stop reason: SURG

## 2022-10-27 RX ORDER — LIDOCAINE HYDROCHLORIDE 20 MG/ML
INJECTION, SOLUTION EPIDURAL; INFILTRATION; INTRACAUDAL; PERINEURAL AS NEEDED
Status: DISCONTINUED | OUTPATIENT
Start: 2022-10-27 | End: 2022-10-27 | Stop reason: SURG

## 2022-10-27 RX ORDER — PROCHLORPERAZINE EDISYLATE 5 MG/ML
5 INJECTION INTRAMUSCULAR; INTRAVENOUS ONCE
Status: COMPLETED | OUTPATIENT
Start: 2022-10-27 | End: 2022-10-27

## 2022-10-27 RX ORDER — LABETALOL HYDROCHLORIDE 5 MG/ML
INJECTION, SOLUTION INTRAVENOUS AS NEEDED
Status: DISCONTINUED | OUTPATIENT
Start: 2022-10-27 | End: 2022-10-27 | Stop reason: SURG

## 2022-10-27 RX ORDER — SODIUM CHLORIDE 0.9 % (FLUSH) 0.9 %
10 SYRINGE (ML) INJECTION AS NEEDED
Status: DISCONTINUED | OUTPATIENT
Start: 2022-10-27 | End: 2022-10-27 | Stop reason: HOSPADM

## 2022-10-27 RX ORDER — HYDRALAZINE HYDROCHLORIDE 20 MG/ML
10 INJECTION INTRAMUSCULAR; INTRAVENOUS ONCE
Status: COMPLETED | OUTPATIENT
Start: 2022-10-27 | End: 2022-10-27

## 2022-10-27 RX ORDER — METOPROLOL TARTRATE 100 MG/1
100 TABLET ORAL 2 TIMES DAILY
COMMUNITY

## 2022-10-27 RX ORDER — HYDRALAZINE HYDROCHLORIDE 20 MG/ML
INJECTION INTRAMUSCULAR; INTRAVENOUS AS NEEDED
Status: DISCONTINUED | OUTPATIENT
Start: 2022-10-27 | End: 2022-10-27 | Stop reason: SURG

## 2022-10-27 RX ORDER — PROPOFOL 10 MG/ML
VIAL (ML) INTRAVENOUS AS NEEDED
Status: DISCONTINUED | OUTPATIENT
Start: 2022-10-27 | End: 2022-10-27 | Stop reason: SURG

## 2022-10-27 RX ORDER — SODIUM CHLORIDE 0.9 % (FLUSH) 0.9 %
10 SYRINGE (ML) INJECTION EVERY 12 HOURS SCHEDULED
Status: DISCONTINUED | OUTPATIENT
Start: 2022-10-27 | End: 2022-10-27 | Stop reason: HOSPADM

## 2022-10-27 RX ORDER — HEPARIN SODIUM (PORCINE) LOCK FLUSH IV SOLN 100 UNIT/ML 100 UNIT/ML
5 SOLUTION INTRAVENOUS AS NEEDED
Status: DISCONTINUED | OUTPATIENT
Start: 2022-10-27 | End: 2022-10-27 | Stop reason: HOSPADM

## 2022-10-27 RX ORDER — FAMOTIDINE 10 MG/ML
20 INJECTION, SOLUTION INTRAVENOUS ONCE
Status: COMPLETED | OUTPATIENT
Start: 2022-10-27 | End: 2022-10-27

## 2022-10-27 RX ORDER — SODIUM CHLORIDE, SODIUM LACTATE, POTASSIUM CHLORIDE, CALCIUM CHLORIDE 600; 310; 30; 20 MG/100ML; MG/100ML; MG/100ML; MG/100ML
30 INJECTION, SOLUTION INTRAVENOUS CONTINUOUS
Status: DISCONTINUED | OUTPATIENT
Start: 2022-10-27 | End: 2022-10-27 | Stop reason: HOSPADM

## 2022-10-27 RX ADMIN — GLYCOPYRROLATE 0.1 MG: 0.2 INJECTION INTRAMUSCULAR; INTRAVENOUS at 08:24

## 2022-10-27 RX ADMIN — FAMOTIDINE 20 MG: 10 INJECTION INTRAVENOUS at 07:21

## 2022-10-27 RX ADMIN — HYDRALAZINE HYDROCHLORIDE 10 MG: 20 INJECTION INTRAMUSCULAR; INTRAVENOUS at 07:20

## 2022-10-27 RX ADMIN — HYDRALAZINE HYDROCHLORIDE 5 MG: 20 INJECTION INTRAMUSCULAR; INTRAVENOUS at 08:32

## 2022-10-27 RX ADMIN — SODIUM CHLORIDE, POTASSIUM CHLORIDE, SODIUM LACTATE AND CALCIUM CHLORIDE: 600; 310; 30; 20 INJECTION, SOLUTION INTRAVENOUS at 08:09

## 2022-10-27 RX ADMIN — LABETALOL HYDROCHLORIDE 10 MG: 5 INJECTION, SOLUTION INTRAVENOUS at 08:22

## 2022-10-27 RX ADMIN — Medication 10 ML: at 09:41

## 2022-10-27 RX ADMIN — LIDOCAINE HYDROCHLORIDE 60 MG: 20 INJECTION, SOLUTION EPIDURAL; INFILTRATION; INTRACAUDAL; PERINEURAL at 08:11

## 2022-10-27 RX ADMIN — PROCHLORPERAZINE EDISYLATE 5 MG: 5 INJECTION INTRAMUSCULAR; INTRAVENOUS at 09:41

## 2022-10-27 RX ADMIN — PROPOFOL 100 MG: 10 INJECTION, EMULSION INTRAVENOUS at 08:11

## 2022-10-27 RX ADMIN — GLUCAGON HYDROCHLORIDE 0.5 MG: 1 INJECTION, POWDER, FOR SOLUTION INTRAMUSCULAR; INTRAVENOUS; SUBCUTANEOUS at 08:20

## 2022-10-27 RX ADMIN — PROCHLORPERAZINE EDISYLATE 5 MG: 5 INJECTION INTRAMUSCULAR; INTRAVENOUS at 09:09

## 2022-10-27 RX ADMIN — HEPARIN 500 UNITS: 100 SYRINGE at 09:43

## 2022-10-27 RX ADMIN — PROPOFOL 150 MCG/KG/MIN: 10 INJECTION, EMULSION INTRAVENOUS at 08:11

## 2022-10-27 RX ADMIN — HEPARIN 500 UNITS: 100 SYRINGE at 07:21

## 2022-10-27 NOTE — ADDENDUM NOTE
Addendum  created 10/27/22 0930 by Yaya Dumont MD    Order list changed, Pharmacy for encounter modified

## 2022-10-27 NOTE — ANESTHESIA PREPROCEDURE EVALUATION
Anesthesia Evaluation     Patient summary reviewed and Nursing notes reviewed   no history of anesthetic complications:  NPO Solid Status: > 8 hours  NPO Liquid Status: > 2 hours           Airway   Mallampati: II  TM distance: >3 FB  Neck ROM: full  No difficulty expected  Dental      Pulmonary - negative pulmonary ROS and normal exam    breath sounds clear to auscultation  Cardiovascular - normal exam  Exercise tolerance: good (4-7 METS)    Rhythm: regular  Rate: normal    (+) hypertension poorly controlled,       Neuro/Psych  (+) CVA,    GI/Hepatic/Renal/Endo - negative ROS     Musculoskeletal     Abdominal    Substance History - negative use     OB/GYN negative ob/gyn ROS         Other   arthritis,      ROS/Med Hx Other: PAT Nursing Notes unavailable.                   Anesthesia Plan    ASA 3     general     (Total IV Anesthesia    Patient understands anesthesia not responsible for dental damage.  Pt possibly aspirated with last egd 7/22)  intravenous induction     Anesthetic plan, risks, benefits, and alternatives have been provided, discussed and informed consent has been obtained with: patient.  Pre-procedure education provided  Plan discussed with CRNA.        CODE STATUS:

## 2022-10-27 NOTE — ANESTHESIA POSTPROCEDURE EVALUATION
Patient: Yazan Sheriff    Procedure Summary     Date: 10/27/22 Room / Location: [facility] ENDOSCOPY 2 / [facility] END[facility]COPY    Anesthesia Start: 0809 Anesthesia Stop: 0841    Procedure: ENTEROSCOPY SMALL BOWEL WITH STRAIGHT FIRE APC APPLICATION, CLIP APPLICATION X1 Diagnosis:       Angiodysplasia of duodenum      (Angiodysplasia of duodenum [K31.819])    Surgeons: Chantelle Diggs MD Provider: Yaya Dumont MD    Anesthesia Type: general ASA Status: 3          Anesthesia Type: general    Vitals  Vitals Value Taken Time   /102 10/27/22 0911   Temp 36.2 °C (97.2 °F) 10/27/22 0846   Pulse 93 10/27/22 0911   Resp 18 10/27/22 0911   SpO2 100 % 10/27/22 0911           Post Anesthesia Care and Evaluation    Patient location during evaluation: bedside  Patient participation: complete - patient participated  Level of consciousness: awake and alert  Pain management: adequate    Airway patency: patent  Anesthetic complications: No anesthetic complications  PONV Status: none  Cardiovascular status: acceptable  Respiratory status: acceptable  Hydration status: acceptable    Comments: An Anesthesiologist personally participated in the most demanding procedures (including induction and emergence if applicable) in the anesthesia plan, monitored the course of anesthesia administration at frequent intervals and remained physically present and available for immediate diagnosis and treatment of emergencies.

## 2022-11-23 ENCOUNTER — TELEPHONE (OUTPATIENT)
Dept: GASTROENTEROLOGY | Facility: CLINIC | Age: 62
End: 2022-11-23

## 2022-11-23 NOTE — TELEPHONE ENCOUNTER
Spoke with patient regarding needing to reschedule their follow up appointment with Rashid Garcia. Patient verbalized understanding and was placed on a recall.

## 2023-02-15 ENCOUNTER — HOSPITAL ENCOUNTER (OUTPATIENT)
Dept: OTHER | Facility: HOSPITAL | Age: 63
Discharge: HOME | End: 2023-02-15

## 2023-02-22 ENCOUNTER — TELEPHONE (OUTPATIENT)
Dept: GASTROENTEROLOGY | Facility: CLINIC | Age: 63
End: 2023-02-22
Payer: COMMERCIAL

## 2023-02-22 NOTE — TELEPHONE ENCOUNTER
Called Yazan Sheriff and spoke with patient regarding scheduling an appointment with Nuris Means. Patient agreed to be scheduled and was scheduled for 03/27 at 9:15 am at our [place] location. Patient verbalized understanding.

## 2023-03-27 ENCOUNTER — OFFICE VISIT (OUTPATIENT)
Dept: GASTROENTEROLOGY | Facility: CLINIC | Age: 63
End: 2023-03-27
Payer: COMMERCIAL

## 2023-03-27 VITALS
HEIGHT: 69 IN | HEART RATE: 67 BPM | DIASTOLIC BLOOD PRESSURE: 90 MMHG | SYSTOLIC BLOOD PRESSURE: 161 MMHG | WEIGHT: 146.6 LBS | BODY MASS INDEX: 21.71 KG/M2

## 2023-03-27 DIAGNOSIS — Z80.0 FH: COLON CANCER: ICD-10-CM

## 2023-03-27 DIAGNOSIS — K31.819 ANGIODYSPLASIA OF DUODENUM: ICD-10-CM

## 2023-03-27 DIAGNOSIS — K29.80 PEPTIC DUODENITIS: ICD-10-CM

## 2023-03-27 DIAGNOSIS — D12.6 ADENOMATOUS POLYP OF COLON, UNSPECIFIED PART OF COLON: ICD-10-CM

## 2023-03-27 DIAGNOSIS — D50.0 IRON DEFICIENCY ANEMIA DUE TO CHRONIC BLOOD LOSS: Primary | ICD-10-CM

## 2023-03-27 PROCEDURE — 99214 OFFICE O/P EST MOD 30 MIN: CPT | Performed by: NURSE PRACTITIONER

## 2023-03-27 RX ORDER — PANTOPRAZOLE SODIUM 40 MG/1
1 TABLET, DELAYED RELEASE ORAL DAILY
COMMUNITY
Start: 2023-02-17

## 2023-03-27 NOTE — PROGRESS NOTES
Chief Complaint   Iron Deficiency Anemia  and Heartburn    History of Present Illness       Yazan Sheriff is a 62 y.o. male who presents to [facility] for follow-up anemia.  He is new to me today.  He was previously seen in our office by nurse practitioner Rashid Garcia on 9/19/2022.    He has a history of iron deficiency anemia secondary to chronic blood loss due to AVMs.  He underwent a small bowel enteroscopy with Dr. Diggs on 10/27/2022 that did show a hiatal hernia with several small bowel AVMs.  APC applied.  Last EGD and colonoscopy was in July 2022.  He does have a history of adenomatous colon polyps.  He also has a family history of colon cancer with his mother.      He also underwent outpatient video capsule study last September.  He has a history of GERD/peptic duodenitis and is happy to report since starting Protonix 40 mg once a day he is swallowing a lot better.  He also tells me that all of his reflux symptoms have resolved.  He tells me he is no longer having black stools.  He tells me his stools are nice and brown now.  He has a good bowel movement every day.  He tells me he has had a lot more green tea to his diet and is trying to exercise more and feels like that is helping.  He does follow with hematology regularly.  He reports his most recent hemoglobin was done last Friday and it was 13.5.    He does have history of AAA repair in 2020 and is on Eliquis.  He also has history of CVA.  He has history of cholecystectomy.  He denies any dysphagia, reflux, abdominal pain, nausea and vomiting, diarrhea, constipation, rectal bleeding or melena.  He admits his appetite is good and his weight has gone up 7 pounds since last visit.  Results       Result Review :       CMP    CMP 9/15/22 9/15/22    1011 1011   Glucose 108    BUN 21 (A)    Creatinine 1.39 (A)    eGFR 57.0 (A)    Sodium 142    Potassium 4.4    Chloride 107    Calcium 9.4    Total Protein  6.8   Total  Protein 7.3    Albumin 4.30 3.8   Globulin  3.0   Globulin 3.0    Total Bilirubin <0.2 (A)    Alkaline Phosphatase 77    AST (SGOT) 15    ALT (SGPT) 9    Albumin/Globulin Ratio 1.4    BUN/Creatinine Ratio 15.1    Anion Gap 11.4    (A) Abnormal value       Comments are available for some flowsheets but are not being displayed.           CBC w/diff    CBC w/Diff 9/15/22 9/15/22    0848 1011   WBC 4.94    RBC 2.78 (A)    Hemoglobin 8.7 (A)    Hematocrit 26.7 (A) 28.1 (A)   MCV 96.0    MCH 31.3    MCHC 32.6    RDW 14.5    Platelets 227    Neutrophil Rel % 81.4 (A)    Immature Granulocyte Rel % 0.6 (A)    Lymphocyte Rel % 10.9 (A)    Monocyte Rel % 5.3    Eosinophil Rel % 1.4    Basophil Rel % 0.4    (A) Abnormal value              Iron Profile   Iron   Date Value Ref Range Status   09/15/2022 51 (L) 59 - 158 mcg/dL Final     TIBC   Date Value Ref Range Status   09/15/2022 276 249 - 505 mcg/dL Final     Iron Saturation   Date Value Ref Range Status   09/15/2022 18 14 - 48 % Final     Transferrin   Date Value Ref Range Status   09/15/2022 197 (L) 200 - 360 mg/dL Final     Ferritin   Ferritin   Date Value Ref Range Status   01/25/2023 117.00 26.00 - 388.00 ng/mL Final               Past Medical History       Past Medical History:   Diagnosis Date   • Anemia     iron deficiency   • Arthritis    • CVA (cerebral vascular accident) (HCC)     with 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       Past Surgical History:   Procedure Laterality Date   • CARDIAC CATHETERIZATION  06/2021    with coil placement   • CARPAL TUNNEL RELEASE Right    • CHOLECYSTECTOMY     • COLONOSCOPY  12/2018    Benign per patient.   • ENDOSCOPY     • ENDOSCOPY N/A  10/27/2022    Procedure: ENTEROSCOPY SMALL BOWEL WITH STRAIGHT FIRE APC APPLICATION, CLIP APPLICATION X1;  Surgeon: Chantelle Diggs MD;  Location: [facility] ENDOSCOPY;  Service: Gastroenterology;  Laterality: N/A;  SMALL BOWEL AVM'S   • LEG REVASCULARIZATION Right 04/2021    Right lower extremity revascularization   • MEDIPORT INSERTION, SINGLE  01/2022   • OTHER SURGICAL HISTORY  08/2020    Stenting aortic arch for aortic aneurysm and thrombosed celiac artery aneurysm   • OTHER SURGICAL HISTORY  02/2020    Saphenous harvest and stent placement in thoracic aorta   • ROTATOR CUFF REPAIR Right    • VASCULAR SURGERY Right 2020    Right lower extremity vascular surgery revascularization from compartment syndrome         Current Outpatient Medications:   •  amLODIPine (NORVASC) 5 MG tablet, Take 5 mg by mouth Daily., Disp: , Rfl:   •  aspirin 81 MG chewable tablet, Chew 1 tablet Daily. Last dose 10/25, Disp: , Rfl:   •  Eliquis 5 MG tablet tablet, Take 1 tablet by mouth 2 (Two) Times a Day. Last dose 10/25 am, Disp: , Rfl:   •  gabapentin (NEURONTIN) 300 MG capsule, Take 1 capsule by mouth As Needed., Disp: , Rfl:   •  HYDROcodone-acetaminophen (NORCO)  MG per tablet, Take 1 tablet by mouth Every 4 (Four) Hours As Needed. for pain, Disp: , Rfl:   •  lisinopril-hydrochlorothiazide (PRINZIDE,ZESTORETIC) 20-12.5 MG per tablet, Take 1 tablet by mouth Daily., Disp: , Rfl:   •  losartan (COZAAR) 100 MG tablet, Take 100 mg by mouth Daily., Disp: , Rfl:   •  metoprolol tartrate (LOPRESSOR) 100 MG tablet, Take 1 tablet by mouth 2 (Two) Times a Day., Disp: , Rfl:   •  pantoprazole (PROTONIX) 40 MG EC tablet, Take 1 tablet by mouth Daily., Disp: , Rfl:   •  terazosin (HYTRIN) 5 MG capsule, Take 5 mg by mouth Daily., Disp: , Rfl:      Allergies   Allergen Reactions   • Clopidogrel Palpitations and Other (See Comments)   • Lactose Nausea And Vomiting and GI Intolerance       Family History   Problem Relation Age of Onset  "  • Colon cancer Mother    • Stroke Mother    • Stomach cancer Father    • Hypertension Sister    • Stroke Sister    • Hypertension Brother    • Malig Hyperthermia Neg Hx         Social History     Social History Narrative   • Not on file       Objective       Review of Systems   Constitutional: Negative for appetite change, fatigue, fever, unexpected weight gain and unexpected weight loss.   HENT: Negative for trouble swallowing.    Respiratory: Negative for cough, choking, chest tightness, shortness of breath, wheezing and stridor.    Cardiovascular: Negative for chest pain, palpitations and leg swelling.   Gastrointestinal: Negative for abdominal distention, abdominal pain, anal bleeding, blood in stool, constipation, diarrhea, nausea, rectal pain, vomiting, GERD and indigestion.   Neurological: Negative for dizziness and confusion.   Psychiatric/Behavioral: Negative for self-injury, sleep disturbance, suicidal ideas and stress. The patient is not nervous/anxious.         Vital Signs:   /90 (BP Location: Right arm, Patient Position: Sitting, Cuff Size: Adult)   Pulse 67   Ht 175.3 cm (69\")   Wt 66.5 kg (146 lb 9.6 oz)   BMI 21.65 kg/m²       Physical Exam  Constitutional:       General: He is not in acute distress.     Appearance: He is well-developed. He is not ill-appearing.   HENT:      Head: Normocephalic.   Eyes:      Pupils: Pupils are equal, round, and reactive to light.   Cardiovascular:      Rate and Rhythm: Normal rate and regular rhythm.      Heart sounds: Normal heart sounds.   Pulmonary:      Effort: Pulmonary effort is normal.   Abdominal:      General: Bowel sounds are normal. There is no distension.      Palpations: Abdomen is soft. There is no mass.      Tenderness: There is no abdominal tenderness. There is no guarding or rebound.      Hernia: No hernia is present.   Musculoskeletal:         General: Normal range of motion.   Skin:     General: Skin is warm and dry.   Neurological:     "  Mental Status: He is alert and oriented to person, place, and time.   Psychiatric:         Speech: Speech normal.         Behavior: Behavior normal.         Judgment: Judgment normal.           Assessment & Plan          Assessment and Plan    Diagnoses and all orders for this visit:    1. Iron deficiency anemia due to chronic blood loss (Primary)    2. Angiodysplasia of duodenum    3. Peptic duodenitis    4. Adenomatous polyp of colon, unspecified part of colon    5. FH: colon cancer    Reviewed small bowel enteroscopy results with him today.  Hemoglobin stable per patient report at 13.5 last Friday.  Will obtain most recent CBC results.  Bowels moving well currently.  No GI bleeding noted.  Patient doing well on Protonix 40 mg once a day.  Continue GERD precautions.  Keep follow-up with hematology as planned.  Next screening colonoscopy will be due in 2025.  Patient to call the office with any issues.  Patient to follow-up with me in 3 months.  Patient is agreeable to the plan.            Follow Up       Follow Up   Return in about 3 months (around 6/27/2023) for ANEMIA.  Patient was given instructions and counseling regarding his condition or for health maintenance advice. Please see specific information pulled into the AVS if appropriate.

## 2023-08-09 ENCOUNTER — OFFICE VISIT (OUTPATIENT)
Dept: UROLOGY | Facility: CLINIC | Age: 63
End: 2023-08-09
Payer: COMMERCIAL

## 2023-08-09 VITALS — RESPIRATION RATE: 16 BRPM | BODY MASS INDEX: 20.29 KG/M2 | WEIGHT: 137 LBS | HEIGHT: 69 IN

## 2023-08-09 DIAGNOSIS — N52.9 ERECTILE DYSFUNCTION, UNSPECIFIED ERECTILE DYSFUNCTION TYPE: ICD-10-CM

## 2023-08-09 DIAGNOSIS — N40.1 BENIGN PROSTATIC HYPERPLASIA WITH LOWER URINARY TRACT SYMPTOMS, SYMPTOM DETAILS UNSPECIFIED: Primary | ICD-10-CM

## 2023-08-09 PROBLEM — N40.0 BENIGN PROSTATIC HYPERPLASIA: Status: ACTIVE | Noted: 2017-03-10

## 2023-08-09 PROBLEM — Z23 COVID-19 VACCINE SERIES STARTED: Status: ACTIVE | Noted: 2021-06-04

## 2023-08-09 PROBLEM — I10 BENIGN ESSENTIAL HYPERTENSION: Status: ACTIVE | Noted: 2017-05-19

## 2023-08-09 PROBLEM — F11.90 OPIOID USE, UNSPECIFIED, UNCOMPLICATED: Status: ACTIVE | Noted: 2021-09-30

## 2023-08-09 PROBLEM — Z95.828 PRESENCE OF OTHER VASCULAR IMPLANTS AND GRAFTS: Status: ACTIVE | Noted: 2022-01-18

## 2023-08-09 PROBLEM — S22.000A COMPRESSION FRACTURE OF THORACIC VERTEBRA: Chronic | Status: ACTIVE | Noted: 2021-02-13

## 2023-08-09 PROBLEM — D47.2 MGUS (MONOCLONAL GAMMOPATHY OF UNKNOWN SIGNIFICANCE): Status: ACTIVE | Noted: 2023-02-01

## 2023-08-09 PROBLEM — I71.019 DISSECTION OF THORACIC AORTA: Chronic | Status: ACTIVE | Noted: 2020-04-17

## 2023-08-09 PROBLEM — I71.9 AORTIC ANEURYSM: Chronic | Status: ACTIVE | Noted: 2017-04-27

## 2023-08-09 PROBLEM — E53.8 FOLATE DEFICIENCY: Status: ACTIVE | Noted: 2023-02-01

## 2023-08-09 PROBLEM — H60.00 FURUNCLE OF EAR: Status: ACTIVE | Noted: 2020-07-17

## 2023-08-09 PROBLEM — R11.0 NAUSEA: Status: ACTIVE | Noted: 2021-10-29

## 2023-08-09 PROBLEM — R07.89 ANTERIOR CHEST WALL PAIN: Status: ACTIVE | Noted: 2020-08-20

## 2023-08-09 PROBLEM — R94.4 RENAL FUNCTION TEST ABNORMAL: Status: ACTIVE | Noted: 2019-05-24

## 2023-08-09 PROBLEM — I71.22 ANEURYSM OF AORTIC ARCH: Status: ACTIVE | Noted: 2020-07-24

## 2023-08-09 PROBLEM — I87.8 POOR VENOUS ACCESS: Status: ACTIVE | Noted: 2021-12-09

## 2023-08-09 PROBLEM — M79.604 PAIN OF RIGHT LOWER EXTREMITY DUE TO ISCHEMIA: Status: ACTIVE | Noted: 2020-05-01

## 2023-08-09 PROBLEM — M54.9 BACKACHE: Status: ACTIVE | Noted: 2020-05-01

## 2023-08-09 PROBLEM — N42.9 DISORDER OF PROSTATE: Status: ACTIVE | Noted: 2021-12-09

## 2023-08-09 PROBLEM — R79.89 SERUM CREATININE RAISED: Status: ACTIVE | Noted: 2017-03-14

## 2023-08-09 PROBLEM — I73.9 PERIPHERAL VASCULAR DISEASE: Status: ACTIVE | Noted: 2017-05-19

## 2023-08-09 PROBLEM — R97.20 RAISED PROSTATE SPECIFIC ANTIGEN: Status: ACTIVE | Noted: 2017-03-17

## 2023-08-09 PROBLEM — M51.369 DEGENERATION OF LUMBAR INTERVERTEBRAL DISC: Status: ACTIVE | Noted: 2021-05-05

## 2023-08-09 PROBLEM — Z91.89 AT RISK OF DISEASE: Status: ACTIVE | Noted: 2021-05-05

## 2023-08-09 PROBLEM — K58.9 ADAPTIVE COLITIS: Status: ACTIVE | Noted: 2023-08-09

## 2023-08-09 PROBLEM — R09.89 ABSENT PEDAL PULSES: Status: ACTIVE | Noted: 2021-03-24

## 2023-08-09 PROBLEM — K90.9 ABNORMAL INTESTINAL ABSORPTION: Status: ACTIVE | Noted: 2022-10-28

## 2023-08-09 PROBLEM — R31.9 HEMATURIA: Status: ACTIVE | Noted: 2021-10-28

## 2023-08-09 PROBLEM — Z98.890 H/O SPINAL SURGERY: Status: ACTIVE | Noted: 2020-03-16

## 2023-08-09 PROBLEM — E78.00 ELEVATED LOW DENSITY LIPOPROTEIN (LDL) CHOLESTEROL LEVEL: Status: ACTIVE | Noted: 2019-08-23

## 2023-08-09 PROBLEM — Z71.89 OTHER SPECIFIED COUNSELING: Status: ACTIVE | Noted: 2020-02-11

## 2023-08-09 PROBLEM — I71.60 THORACOABDOMINAL AORTIC ANEURYSM: Chronic | Status: ACTIVE | Noted: 2020-04-29

## 2023-08-09 PROBLEM — I82.409 DEEP VENOUS THROMBOSIS: Chronic | Status: ACTIVE | Noted: 2021-05-05

## 2023-08-09 PROBLEM — I99.8 PAIN OF RIGHT LOWER EXTREMITY DUE TO ISCHEMIA: Status: ACTIVE | Noted: 2020-05-01

## 2023-08-09 PROBLEM — Z98.890 OTHER SPECIFIED POSTPROCEDURAL STATES: Status: ACTIVE | Noted: 2021-09-30

## 2023-08-09 PROBLEM — R39.9 SYMPTOMS INVOLVING URINARY SYSTEM: Status: ACTIVE | Noted: 2021-10-29

## 2023-08-09 PROBLEM — R56.9 SEIZURE: Chronic | Status: ACTIVE | Noted: 2020-12-13

## 2023-08-09 PROBLEM — I63.9 CEREBROVASCULAR ACCIDENT: Chronic | Status: ACTIVE | Noted: 2021-05-05

## 2023-08-09 PROBLEM — Z79.01 LONG TERM (CURRENT) USE OF ANTICOAGULANTS: Status: ACTIVE | Noted: 2022-02-21

## 2023-08-09 PROBLEM — M51.36 DEGENERATION OF LUMBAR INTERVERTEBRAL DISC: Status: ACTIVE | Noted: 2021-05-05

## 2023-08-09 PROBLEM — D64.9 ABSOLUTE ANEMIA: Status: ACTIVE | Noted: 2023-08-09

## 2023-08-09 LAB
BILIRUB BLD-MCNC: NEGATIVE MG/DL
CLARITY, POC: CLEAR
COLOR UR: YELLOW
EXPIRATION DATE: ABNORMAL
GLUCOSE UR STRIP-MCNC: NEGATIVE MG/DL
KETONES UR QL: ABNORMAL
LEUKOCYTE EST, POC: NEGATIVE
Lab: ABNORMAL
NITRITE UR-MCNC: NEGATIVE MG/ML
PH UR: 5 [PH] (ref 5–8)
PROT UR STRIP-MCNC: ABNORMAL MG/DL
RBC # UR STRIP: ABNORMAL /UL
SP GR UR: 1.02 (ref 1–1.03)
URINE VOLUME: 49
UROBILINOGEN UR QL: NORMAL

## 2023-08-09 PROCEDURE — 99203 OFFICE O/P NEW LOW 30 MIN: CPT | Performed by: NURSE PRACTITIONER

## 2023-08-09 PROCEDURE — 81003 URINALYSIS AUTO W/O SCOPE: CPT | Performed by: NURSE PRACTITIONER

## 2023-08-09 RX ORDER — TADALAFIL 5 MG/1
5 TABLET ORAL DAILY
Qty: 90 TABLET | Refills: 3 | Status: SHIPPED | OUTPATIENT
Start: 2023-08-09

## 2023-08-09 RX ORDER — CYCLOBENZAPRINE HCL 10 MG
TABLET ORAL
COMMUNITY
Start: 2023-07-13

## 2023-08-09 NOTE — PROGRESS NOTES
Chief Complaint: Erectile Dysfunction    Subjective         History of Present Illness  Yazan Sheriff is a 63 y.o. male presents to [facility] UROLOGY to be seen for ed/ BPH.    He states urinary urgency and frequency.    He states he can get an erection but cannot maintain.    He states that he has a HX of elevated psa.    PSA was 3.8 7/11/23    He states he had seen Dr. Mitchell for prostate issues and biopsy before.    He is on terazosin 5mg q day for his HTN.    Nocturia 7-8     He states he is with a postvoid dribble.     He has a feeling of incomplete emptying.     It takes a long time for him to void.     He states some straining to void.    He does have hx of aortic aneurysm.      Objective     Past Medical History:   Diagnosis Date    Anemia     iron deficiency    Arthritis     CVA (cerebral vascular accident)     with 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       Past Surgical History:   Procedure Laterality Date    CARDIAC CATHETERIZATION  06/2021    with coil placement    CARPAL TUNNEL RELEASE Right     CHOLECYSTECTOMY      COLONOSCOPY  12/2018    Benign per patient.    ENDOSCOPY      ENDOSCOPY N/A 10/27/2022    Procedure: ENTEROSCOPY SMALL BOWEL WITH STRAIGHT FIRE APC APPLICATION, CLIP APPLICATION X1;  Surgeon: Chantelle Diggs MD;  [facility];  Service: Gastroenterology;  Laterality: N/A;  SMALL BOWEL AVM'S    LEG REVASCULARIZATION Right 04/2021    Right lower extremity revascularization    MEDIPORT INSERTION, SINGLE  01/2022    OTHER SURGICAL HISTORY  08/2020    Stenting aortic arch for aortic aneurysm and thrombosed celiac artery aneurysm    OTHER SURGICAL HISTORY   02/2020    Saphenous harvest and stent placement in thoracic aorta    ROTATOR CUFF REPAIR Right     VASCULAR SURGERY Right 2020    Right lower extremity vascular surgery revascularization from compartment syndrome         Current Outpatient Medications:     amLODIPine (NORVASC) 5 MG tablet, Take 1 tablet by mouth Daily., Disp: , Rfl:     aspirin 81 MG chewable tablet, Chew 1 tablet Daily. Last dose 10/25, Disp: , Rfl:     cyclobenzaprine (FLEXERIL) 10 MG tablet, , Disp: , Rfl:     diclofenac (VOLTAREN) 75 MG EC tablet, Take 1 tablet by mouth 2 (Two) Times a Day., Disp: , Rfl:     Eliquis 5 MG tablet tablet, Take 1 tablet by mouth 2 (Two) Times a Day. Last dose 10/25 am, Disp: , Rfl:     gabapentin (NEURONTIN) 300 MG capsule, Take 1 capsule by mouth As Needed., Disp: , Rfl:     HYDROcodone-acetaminophen (NORCO)  MG per tablet, Take 1 tablet by mouth Every 4 (Four) Hours As Needed. for pain, Disp: , Rfl:     lisinopril-hydrochlorothiazide (PRINZIDE,ZESTORETIC) 20-12.5 MG per tablet, Take 1 tablet by mouth Daily., Disp: , Rfl:     losartan (COZAAR) 100 MG tablet, Take 1 tablet by mouth Daily., Disp: , Rfl:     methylPREDNISolone (MEDROL) 4 MG dose pack, follow package directions, Disp: , Rfl:     metoprolol tartrate (LOPRESSOR) 100 MG tablet, Take 1 tablet by mouth 2 (Two) Times a Day., Disp: , Rfl:     pantoprazole (PROTONIX) 40 MG EC tablet, Take 1 tablet by mouth Daily., Disp: 90 tablet, Rfl: 3    terazosin (HYTRIN) 5 MG capsule, Take 1 capsule by mouth Daily., Disp: , Rfl:     tadalafil (CIALIS) 5 MG tablet, Take 1 tablet by mouth Daily., Disp: 90 tablet, Rfl: 3    Allergies   Allergen Reactions    Clopidogrel Palpitations and Other (See Comments)    Lactose Nausea And Vomiting and GI Intolerance        Family History   Problem Relation Age of Onset    Colon cancer Mother     Stroke Mother     Stomach cancer Father     Hypertension Sister     Stroke Sister     Hypertension Brother     Malig Hyperthermia Neg  "Hx        Social History     Socioeconomic History    Marital status:     Number of children: 5   Tobacco Use    Smoking status: Former     Packs/day: 0.50     Years: 8.00     Pack years: 4.00     Types: Cigarettes     Start date: 1972     Quit date: 2020     Years since quitting: 3.6    Smokeless tobacco: Never    Tobacco comments:     Stopped and started quite a few times.    Vaping Use    Vaping Use: Never used   Substance and Sexual Activity    Alcohol use: Never    Drug use: Yes     Types: Marijuana    Sexual activity: Defer       Vital Signs:   Resp 16   Ht 175.3 cm (69\")   Wt 62.1 kg (137 lb)   BMI 20.23 kg/mý      Physical Exam     Result Review :   The following data was reviewed by: DARELL Gilbert on 08/09/2023:  Results for orders placed or performed in visit on 08/09/23   Bladder Scan   Result Value Ref Range    Urine Volume 49    POC Urinalysis Dipstick, Automated    Specimen: Urine   Result Value Ref Range    Color Yellow Yellow, Straw, Dark Yellow, Christina    Clarity, UA Clear Clear    Specific Gravity  1.025 1.005 - 1.030    pH, Urine 5.0 5.0 - 8.0    Leukocytes Negative Negative    Nitrite, UA Negative Negative    Protein, POC 30 mg/dL (A) Negative mg/dL    Glucose, UA Negative Negative mg/dL    Ketones, UA Trace (A) Negative    Urobilinogen, UA Normal Normal, 0.2 E.U./dL    Bilirubin Negative Negative    Blood, UA Trace (A) Negative    Lot Number [id number]     Expiration Date 8/2,024         Bladder Scan interpretation 08/09/2023    Estimation of residual urine via BVI 3000 Verathon Bladder Scan  MA/nurse performing: amador higginbotham Rn   Residual Urine: 49 ml  Indication: Benign prostatic hyperplasia with lower urinary tract symptoms, symptom details unspecified    Erectile dysfunction, unspecified erectile dysfunction type   Position: Supine  Examination: Incremental scanning of the suprapubic area using 2.0 MHz transducer using copious amounts of acoustic gel.   Findings: An anechoic area " was demonstrated which represented the bladder, with measurement of residual urine as noted. I inspected this myself. In that the residual urine was stable or insignificant, refer to plan for treatment and plan necessary at this time.          Procedures        Assessment and Plan    Diagnoses and all orders for this visit:    1. Benign prostatic hyperplasia with lower urinary tract symptoms, symptom details unspecified (Primary)  -     POC Urinalysis Dipstick, Automated  -     Bladder Scan  -     tadalafil (CIALIS) 5 MG tablet; Take 1 tablet by mouth Daily.  Dispense: 90 tablet; Refill: 3    2. Erectile dysfunction, unspecified erectile dysfunction type  -     POC Urinalysis Dipstick, Automated  -     Bladder Scan      Yecenia with patient at this point in time given his symptoms I would like to try him on daily tadalafil to see if this would help to decrease urinary urgency and frequency as well as help with his erectile dysfunction.          I spent 15 minutes caring for Yazan on this date of service. This time includes time spent by me in the following activities:reviewing tests, obtaining and/or reviewing a separately obtained history, performing a medically appropriate examination and/or evaluation , counseling and educating the patient/family/caregiver, ordering medications, tests, or procedures, and documenting information in the medical record  Follow Up   Return in about 3 months (around 11/9/2023) for f/u bph/ ed .  Patient was given instructions and counseling regarding his condition or for health maintenance advice. Please see specific information pulled into the AVS if appropriate.         This document has been electronically signed by DARELL Gilbert  August 9, 2023 10:02 EDT

## 2024-01-08 ENCOUNTER — TELEPHONE (OUTPATIENT)
Dept: UROLOGY | Facility: CLINIC | Age: 64
End: 2024-01-08

## 2025-02-10 NOTE — TELEPHONE ENCOUNTER
Multiple views of the left coronary artery obtained using power injection. Please schedule patient for follow-up appointment.  Angioectasias were treated with APC.

## 2025-02-17 DIAGNOSIS — H91.90 HEARING LOSS, UNSPECIFIED HEARING LOSS TYPE, UNSPECIFIED LATERALITY: Primary | ICD-10-CM

## 2025-02-17 DIAGNOSIS — H93.19 TINNITUS, UNSPECIFIED LATERALITY: ICD-10-CM

## 2025-02-18 ENCOUNTER — PROCEDURE VISIT (OUTPATIENT)
Dept: OTOLARYNGOLOGY | Facility: CLINIC | Age: 65
End: 2025-02-18
Payer: COMMERCIAL

## 2025-02-18 DIAGNOSIS — H69.91 EUSTACHIAN TUBE DYSFUNCTION, RIGHT: ICD-10-CM

## 2025-02-18 DIAGNOSIS — H69.92 DYSFUNCTION OF LEFT EUSTACHIAN TUBE: ICD-10-CM

## 2025-02-18 DIAGNOSIS — H93.13 TINNITUS, BILATERAL: Primary | ICD-10-CM

## 2025-02-18 DIAGNOSIS — H90.11 CONDUCTIVE HEARING LOSS OF RIGHT EAR WITH UNRESTRICTED HEARING OF LEFT EAR: ICD-10-CM

## 2025-02-18 PROCEDURE — 92567 TYMPANOMETRY: CPT | Performed by: AUDIOLOGIST

## 2025-02-18 PROCEDURE — 92557 COMPREHENSIVE HEARING TEST: CPT | Performed by: AUDIOLOGIST

## 2025-02-18 NOTE — PROGRESS NOTES
AUDIOMETRIC EVALUATION      Name:  Yazan Sheriff  :  1960  Age:  64 y.o.  Date of Evaluation:  2025       History:  Mr. Sheriff is seen today for a hearing evaluation due to aural fullness right ear.    Audiologic Information:  Concerns for Hearing: Yes  PETs: No  Other otologic surgical history: None  Aural Pressure/Fullness: Right ear  Otalgia: No  Otorrhea: None  Tinnitus: Periodic  Dizziness: No  Noise Exposure: Some  Family history of hearing loss: Unknown  Head trauma requiring hospital stay: No  Chemotherapy: None  Other significant history: No      EVALUATION:    See audiogram    RESULTS:    Otoscopic Evaluation:  Right: Unremarkable  Left: Unremarkable    Tympanometry (226 Hz):  Right: Type B, Normal ECV  Left: Type B, Normal ECV    IMPRESSIONS:  Pure tone thresholds for the right ear indicates a mild low-frequency conductive loss at 0.2 5K-1K hertz.  Pure tone thresholds for the left ear indicates a low normal hearing through most speech frequencies  Patient was counseled with regard to the findings.    RECOMMENDATIONS/PLAN:  Follow-up follow-up with primary care physician for eustachian tube dysfunction  Discussed results and recommendations with patient. Questions were addressed and the patient was encouraged to contact our department should concerns arise.          John Guo M.S, [facility]  Licensed Audiologist

## 2025-03-03 DIAGNOSIS — R97.20 ELEVATED PROSTATE SPECIFIC ANTIGEN (PSA): ICD-10-CM

## 2025-03-03 DIAGNOSIS — N40.1 BENIGN PROSTATIC HYPERPLASIA WITH LOWER URINARY TRACT SYMPTOMS, SYMPTOM DETAILS UNSPECIFIED: Primary | ICD-10-CM

## 2025-03-04 ENCOUNTER — LAB (OUTPATIENT)
Dept: LAB | Facility: HOSPITAL | Age: 65
End: 2025-03-04
Payer: MEDICARE

## 2025-03-04 DIAGNOSIS — R97.20 ELEVATED PROSTATE SPECIFIC ANTIGEN (PSA): ICD-10-CM

## 2025-03-04 LAB — PSA SERPL-MCNC: 6.64 NG/ML (ref 0–4)

## 2025-03-04 PROCEDURE — 36415 COLL VENOUS BLD VENIPUNCTURE: CPT

## 2025-03-04 PROCEDURE — 84153 ASSAY OF PSA TOTAL: CPT

## 2025-03-05 ENCOUNTER — OFFICE VISIT (OUTPATIENT)
Dept: UROLOGY | Facility: CLINIC | Age: 65
End: 2025-03-05
Payer: MEDICARE

## 2025-03-05 VITALS — HEIGHT: 69 IN | BODY MASS INDEX: 21.62 KG/M2 | RESPIRATION RATE: 18 BRPM | WEIGHT: 146 LBS

## 2025-03-05 DIAGNOSIS — R97.20 ELEVATED PROSTATE SPECIFIC ANTIGEN (PSA): ICD-10-CM

## 2025-03-05 DIAGNOSIS — N40.1 BENIGN PROSTATIC HYPERPLASIA WITH LOWER URINARY TRACT SYMPTOMS, SYMPTOM DETAILS UNSPECIFIED: Primary | ICD-10-CM

## 2025-03-05 DIAGNOSIS — R31.29 MICROHEMATURIA: ICD-10-CM

## 2025-03-05 PROBLEM — D63.1 ANEMIA OF CHRONIC KIDNEY FAILURE, STAGE 3 (MODERATE): Chronic | Status: ACTIVE | Noted: 2024-05-10

## 2025-03-05 PROBLEM — R39.9 SYMPTOMS INVOLVING URINARY SYSTEM: Status: RESOLVED | Noted: 2021-10-29 | Resolved: 2025-03-05

## 2025-03-05 PROBLEM — Z98.890 OTHER SPECIFIED POSTPROCEDURAL STATES: Status: RESOLVED | Noted: 2021-09-30 | Resolved: 2025-03-05

## 2025-03-05 PROBLEM — R56.9 SEIZURE: Chronic | Status: RESOLVED | Noted: 2020-12-13 | Resolved: 2025-03-05

## 2025-03-05 PROBLEM — Z95.828 PRESENCE OF OTHER VASCULAR IMPLANTS AND GRAFTS: Status: RESOLVED | Noted: 2022-01-18 | Resolved: 2025-03-05

## 2025-03-05 PROBLEM — K58.9 ADAPTIVE COLITIS: Status: RESOLVED | Noted: 2023-08-09 | Resolved: 2025-03-05

## 2025-03-05 PROBLEM — M54.16 RIGHT LUMBAR RADICULOPATHY: Status: ACTIVE | Noted: 2023-08-15

## 2025-03-05 PROBLEM — I99.8 PAIN OF RIGHT LOWER EXTREMITY DUE TO ISCHEMIA: Status: RESOLVED | Noted: 2020-05-01 | Resolved: 2025-03-05

## 2025-03-05 PROBLEM — F11.90 CHRONIC, CONTINUOUS USE OF OPIOIDS: Status: ACTIVE | Noted: 2021-09-30

## 2025-03-05 PROBLEM — H60.00 FURUNCLE OF EAR: Status: RESOLVED | Noted: 2020-07-17 | Resolved: 2025-03-05

## 2025-03-05 PROBLEM — N42.9 DISORDER OF PROSTATE: Status: RESOLVED | Noted: 2021-12-09 | Resolved: 2025-03-05

## 2025-03-05 PROBLEM — S22.000A COMPRESSION FRACTURE OF THORACIC VERTEBRA: Chronic | Status: RESOLVED | Noted: 2021-02-13 | Resolved: 2025-03-05

## 2025-03-05 PROBLEM — I87.8 POOR VENOUS ACCESS: Status: RESOLVED | Noted: 2021-12-09 | Resolved: 2025-03-05

## 2025-03-05 PROBLEM — K90.9 ABNORMAL INTESTINAL ABSORPTION: Status: RESOLVED | Noted: 2022-10-28 | Resolved: 2025-03-05

## 2025-03-05 PROBLEM — D47.2 MGUS (MONOCLONAL GAMMOPATHY OF UNKNOWN SIGNIFICANCE): Status: RESOLVED | Noted: 2023-02-01 | Resolved: 2025-03-05

## 2025-03-05 PROBLEM — M79.604 PAIN OF RIGHT LOWER EXTREMITY DUE TO ISCHEMIA: Status: RESOLVED | Noted: 2020-05-01 | Resolved: 2025-03-05

## 2025-03-05 PROBLEM — N18.30 ANEMIA OF CHRONIC KIDNEY FAILURE, STAGE 3 (MODERATE): Chronic | Status: ACTIVE | Noted: 2024-05-10

## 2025-03-05 PROBLEM — K90.9 IRON MALABSORPTION: Status: ACTIVE | Noted: 2023-08-14

## 2025-03-05 PROBLEM — R11.0 NAUSEA: Status: RESOLVED | Noted: 2021-10-29 | Resolved: 2025-03-05

## 2025-03-05 PROBLEM — Z91.89 AT RISK OF DISEASE: Status: RESOLVED | Noted: 2021-05-05 | Resolved: 2025-03-05

## 2025-03-05 PROBLEM — I71.9 AORTIC ANEURYSM: Chronic | Status: RESOLVED | Noted: 2017-04-27 | Resolved: 2025-03-05

## 2025-03-05 PROBLEM — R07.89 ANTERIOR CHEST WALL PAIN: Status: RESOLVED | Noted: 2020-08-20 | Resolved: 2025-03-05

## 2025-03-05 PROBLEM — Z23 COVID-19 VACCINE SERIES STARTED: Status: RESOLVED | Noted: 2021-06-04 | Resolved: 2025-03-05

## 2025-03-05 PROBLEM — F11.90 OPIOID USE, UNSPECIFIED, UNCOMPLICATED: Status: RESOLVED | Noted: 2021-09-30 | Resolved: 2025-03-05

## 2025-03-05 PROBLEM — R79.89 SERUM CREATININE RAISED: Status: RESOLVED | Noted: 2017-03-14 | Resolved: 2025-03-05

## 2025-03-05 PROBLEM — K58.0 IRRITABLE BOWEL SYNDROME WITH DIARRHEA: Status: ACTIVE | Noted: 2024-09-23

## 2025-03-05 PROBLEM — D64.9 ABSOLUTE ANEMIA: Status: RESOLVED | Noted: 2023-08-09 | Resolved: 2025-03-05

## 2025-03-05 PROBLEM — R09.89 ABSENT PEDAL PULSES: Status: RESOLVED | Noted: 2021-03-24 | Resolved: 2025-03-05

## 2025-03-05 PROBLEM — M54.9 BACKACHE: Status: RESOLVED | Noted: 2020-05-01 | Resolved: 2025-03-05

## 2025-03-05 PROBLEM — I71.60 THORACOABDOMINAL AORTIC ANEURYSM: Chronic | Status: RESOLVED | Noted: 2020-04-29 | Resolved: 2025-03-05

## 2025-03-05 PROBLEM — Z98.890 H/O SPINAL SURGERY: Status: RESOLVED | Noted: 2020-03-16 | Resolved: 2025-03-05

## 2025-03-05 PROBLEM — R94.4 RENAL FUNCTION TEST ABNORMAL: Status: RESOLVED | Noted: 2019-05-24 | Resolved: 2025-03-05

## 2025-03-05 LAB
BACTERIA UR QL AUTO: ABNORMAL /HPF
BILIRUB BLD-MCNC: NEGATIVE MG/DL
CLARITY, POC: CLEAR
COLOR UR: YELLOW
EXPIRATION DATE: ABNORMAL
GLUCOSE UR STRIP-MCNC: NEGATIVE MG/DL
HYALINE CASTS UR QL AUTO: ABNORMAL /LPF
KETONES UR QL: NEGATIVE
LEUKOCYTE EST, POC: NEGATIVE
Lab: ABNORMAL
NITRITE UR-MCNC: NEGATIVE MG/ML
PH UR: 5.5 [PH] (ref 5–8)
PROT UR STRIP-MCNC: ABNORMAL MG/DL
RBC # UR STRIP: ABNORMAL /HPF
RBC # UR STRIP: ABNORMAL /UL
REF LAB TEST METHOD: ABNORMAL
SP GR UR: 1.02 (ref 1–1.03)
SQUAMOUS #/AREA URNS HPF: ABNORMAL /HPF
URINE VOLUME: 57
UROBILINOGEN UR QL: ABNORMAL
WBC # UR STRIP: ABNORMAL /HPF

## 2025-03-05 PROCEDURE — 81015 MICROSCOPIC EXAM OF URINE: CPT | Performed by: NURSE PRACTITIONER

## 2025-03-05 RX ORDER — FOLIC ACID 1 MG/1
1000 TABLET ORAL DAILY
COMMUNITY

## 2025-03-05 RX ORDER — HYDROXYZINE HYDROCHLORIDE 50 MG/1
1 TABLET, FILM COATED ORAL 2 TIMES DAILY PRN
COMMUNITY
Start: 2025-02-17

## 2025-03-05 RX ORDER — AMIODARONE HYDROCHLORIDE 100 MG/1
1 TABLET ORAL DAILY
COMMUNITY

## 2025-03-05 RX ORDER — METOPROLOL SUCCINATE 100 MG/1
100 TABLET, EXTENDED RELEASE ORAL
COMMUNITY
Start: 2025-01-22 | End: 2025-03-05

## 2025-03-05 RX ORDER — DICYCLOMINE HCL 20 MG
20 TABLET ORAL
COMMUNITY
Start: 2024-09-23 | End: 2025-03-05

## 2025-03-05 NOTE — PROGRESS NOTES
"Chief Complaint: Follow-up (Benign prostatic hyperplasia with lower urinary tract symptoms, symptom details unspecified)    Subjective         History of Present Illness  Yazan Sheriff is a 64 y.o. male presents to [facility] to be seen for elevated.    Patient previously seen by myself in August 2023.     At that point in time given his symptoms we had tried him on daily tadalafil to see if that would decrease his urinary urgency and frequency as well as help with the ED however it appears that the patient was lost to follow-up.    The patient was seen by his primary care provider back in September 2024 for his annual wellness visit and had labs ordered however it appears that the labs were not performed until quite sometime later.    The patient's PSA was repeated on 11/11/2024 and was noted to be 5.60.    Prior PSA from 2023 was only noted to be 3.8.    Patient had his PSA level repeated just yesterday and PSA was found to be 6.64.    He remains on tadalafil and terazosin for ed/ bph.    He states that he is with more nocturia recently. He states that he has not been exercising as much.     He states that he has a weaker stream.     Nocturia x 3     He states the biggest issue is with urgency and has had rare episodes of urge incontinence.     He reports a sudden change in urinary symptoms.    He does have stents both vascular and cardiac. He states that he does not have any cards for this but did have MRI cyberknife procedure in 2023.     A review of his last vascular note\"underwent left to right femoral-femoral bypass with PTFE graft, right lower leg four-compartment fasciotomy, open right popliteal-tibial artery thromboembolectomy, endovascular repair of type B aortic dissection without coverage of the left subclavian artery by Dr. Rodrgiuez for acute type B aortic dissection with severe compression of the true lumen as well as acute occlusion of the right common iliac artery and " "external iliac artery causing malperfusion of the right leg.  March 1, 2020- right common iliac artery achieved recanalization on its own and he has femorofemoral bypass subsequently occluded without issue  March 7, 2020-closure of his medial incision and partial closure of his lateral incision   August 13, 2020- final stage of his TEVAR procedure with bridging stent from his elephant trunk repair to previous TEVAR site  April 15, 2021-open thrombectomy of the right common femoral artery with ligation of the left to right femoral-femoral bypass, right popliteal to TP trunk thromboendarterectomy   He is following up with Dr. Dale for aortic arch repair.\"    He does have CKD unsure if followed by  them at current.     He does see oncology got anemia.     Intermittent cigarette smoker lifelong.       Previous:  He states urinary urgency and frequency.   He states he can get an erection but cannot maintain.   He states that he has a HX of elevated psa.   PSA was 3.8 7/11/23   He states he had seen Dr. Mitchell for prostate issues and biopsy before.   He is on terazosin 5mg q day for his HTN.   Nocturia 7-8    He states he is with a postvoid dribble.    He has a feeling of incomplete emptying.    It takes a long time for him to void.    He states some straining to void.   He does have hx of aortic aneurysm.       Objective     Past Medical History:   Diagnosis Date    Anemia     iron deficiency    Arthritis     CVA (cerebral vascular accident)     with 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       Past Surgical History:   Procedure Laterality Date    CARDIAC CATHETERIZATION  06/2021    with coil placement    CARPAL TUNNEL RELEASE " Right     CHOLECYSTECTOMY      COLONOSCOPY  12/2018    Benign per patient.    ENDOSCOPY      ENDOSCOPY N/A 10/27/2022    Procedure: ENTEROSCOPY SMALL BOWEL WITH STRAIGHT FIRE APC APPLICATION, CLIP APPLICATION X1;  Surgeon: Chantelle Diggs MD;  Location: [facility] END[facility];  Service: Gastroenterology;  Laterality: N/A;  SMALL BOWEL AVM'S    LEG REVASCULARIZATION Right 04/2021    Right lower extremity revascularization    MEDIPORT INSERTION, SINGLE  01/2022    OTHER SURGICAL HISTORY  08/2020    Stenting aortic arch for aortic aneurysm and thrombosed celiac artery aneurysm    OTHER SURGICAL HISTORY  02/2020    Saphenous harvest and stent placement in thoracic aorta    ROTATOR CUFF REPAIR Right     VASCULAR SURGERY Right 2020    Right lower extremity vascular surgery revascularization from compartment syndrome         Current Outpatient Medications:     amiodarone (PACERONE) 100 MG tablet, Take 1 tablet by mouth Daily., Disp: , Rfl:     amLODIPine (NORVASC) 5 MG tablet, Take 1 tablet by mouth Daily., Disp: , Rfl:     aspirin 81 MG chewable tablet, Chew 1 tablet Daily. Last dose 10/25, Disp: , Rfl:     cyclobenzaprine (FLEXERIL) 10 MG tablet, , Disp: , Rfl:     Eliquis 5 MG tablet tablet, Take 1 tablet by mouth 2 (Two) Times a Day. Last dose 10/25 am, Disp: , Rfl:     folic acid (FOLVITE) 1 MG tablet, Take 1 tablet by mouth Daily., Disp: , Rfl:     gabapentin (NEURONTIN) 300 MG capsule, Take 1 capsule by mouth As Needed., Disp: , Rfl:     HYDROcodone-acetaminophen (NORCO)  MG per tablet, Take 1 tablet by mouth Every 4 (Four) Hours As Needed. for pain, Disp: , Rfl:     hydrOXYzine (ATARAX) 50 MG tablet, Take 1 tablet by mouth 2 (Two) Times a Day As Needed., Disp: , Rfl:     lisinopril-hydrochlorothiazide (PRINZIDE,ZESTORETIC) 20-12.5 MG per tablet, Take 1 tablet by mouth Daily., Disp: , Rfl:     losartan (COZAAR) 100 MG tablet, Take 1 tablet by mouth Daily., Disp: , Rfl:     metoprolol tartrate (LOPRESSOR) 100  "MG tablet, Take 1 tablet by mouth 2 (Two) Times a Day., Disp: , Rfl:     tadalafil (CIALIS) 5 MG tablet, Take 1 tablet by mouth Daily., Disp: 90 tablet, Rfl: 3    terazosin (HYTRIN) 5 MG capsule, Take 1 capsule by mouth Daily., Disp: , Rfl:     Allergies   Allergen Reactions    Clopidogrel Palpitations and Other (See Comments)    Lactose Nausea And Vomiting and GI Intolerance        Family History   Problem Relation Age of Onset    Colon cancer Mother     Stroke Mother     Stomach cancer Father     Hypertension Sister     Stroke Sister     Hypertension Brother     Malig Hyperthermia Neg Hx        Social History     Socioeconomic History    Marital status:     Number of children: 5   Tobacco Use    Smoking status: Former     Current packs/day: 0.00     Average packs/day: 0.5 packs/day for 48.0 years (24.0 ttl pk-yrs)     Types: Cigarettes     Start date:      Quit date:      Years since quittin.1     Passive exposure: Past    Smokeless tobacco: Never    Tobacco comments:     Stopped and started quite a few times.    Vaping Use    Vaping status: Never Used   Substance and Sexual Activity    Alcohol use: Never    Drug use: Yes     Types: Marijuana    Sexual activity: Defer       Vital Signs:   Resp 18   Ht 175.3 cm (69.02\")   Wt 66.2 kg (146 lb)   BMI 21.55 kg/m²      Physical Exam     Result Review :   The following data was reviewed by: DARELL Gilbert on 2025:  Results for orders placed or performed in visit on 25   Bladder Scan    Collection Time: 25  8:28 AM   Result Value Ref Range    Urine Volume 57    POC Urinalysis Dipstick, Automated    Collection Time: 25  8:29 AM    Specimen: Urine   Result Value Ref Range    Color Yellow Yellow, Straw, Dark Yellow, Christina    Clarity, UA Clear Clear    Specific Gravity  1.020 1.005 - 1.030    pH, Urine 5.5 5.0 - 8.0    Leukocytes Negative Negative    Nitrite, UA Negative Negative    Protein, POC 30 mg/dL (A) Negative " mg/dL    Glucose, UA Negative Negative mg/dL    Ketones, UA Negative Negative    Urobilinogen, UA 0.2 E.U./dL Normal, 0.2 E.U./dL    Bilirubin Negative Negative    Blood, UA Large (A) Negative    Lot Number [id number]     Expiration Date 9/2,025       PSA          3/4/2025    09:02   PSA   PSA 6.640      Bladder Scan interpretation 03/05/2025    Estimation of residual urine via BVI 3000 [address] Bladder Scan  MA/nurse performing: karen son Rn   Residual Urine: 57 ml  Indication: Benign prostatic hyperplasia with lower urinary tract symptoms, symptom details unspecified    Microhematuria    Elevated prostate specific antigen (PSA)   Position: Supine  Examination: Incremental scanning of the suprapubic area using 2.0 MHz transducer using copious amounts of acoustic gel.   Findings: An anechoic area was demonstrated which represented the bladder, with measurement of residual urine as noted. I inspected this myself. In that the residual urine was stable or insignificant, refer to plan for treatment and plan necessary at this time.          Procedures        Assessment and Plan    Diagnoses and all orders for this visit:    1. Benign prostatic hyperplasia with lower urinary tract symptoms, symptom details unspecified (Primary)  -     Bladder Scan  -     POC Urinalysis Dipstick, Automated    2. Microhematuria  -     Urinalysis, Microscopic Only - Urine, Clean Catch; Future  -     CT Abdomen Pelvis With & Without Contrast; Future  -     Cystoscopy; Future    3. Elevated prostate specific antigen (PSA)  -     MRI Prostate With & Without Contrast; Future        Given microscopic hematuria we will go ahead and proceed with upper and lower urinary tract evaluation with a CT of the abdomen and pelvis with and without contrast with delayed imaging to evaluate upper urinary tract.  Did discuss with the patient limitations of upper urinary tract imaging including inability to see the full lining of the inside of the bladder therefore  the need for a cystoscopy to further evaluate for any etiology contributing to hematuria.  Discussed that a cystoscopy is where one of the physicians will take a camera into the urethra to examine the inside of the urethra as well as the inside lining of the bladder to fully rule out any underlying pathology that might be contributing to hematuria.    In regards to patient's elevated PSA given continued increased we will proceed with an MRI of the prostate to delineate underlying etiology of elevated PSA and provide mapping for fusion biopsy.      I spent 34 minutes caring for Yazan on this date of service. This time includes time spent by me in the following activities:reviewing tests, obtaining and/or reviewing a separately obtained history, performing a medically appropriate examination and/or evaluation , counseling and educating the patient/family/caregiver, ordering medications, tests, or procedures, and documenting information in the medical record  Follow Up   Return in about 3 months (around 6/5/2025) for With Dr. Varela For Cystoscopy.  Patient was given instructions and counseling regarding his condition or for health maintenance advice. Please see specific information pulled into the AVS if appropriate.         This document has been electronically signed by DARELL Gilbert  March 5, 2025 09:12 EST

## 2025-03-06 ENCOUNTER — TELEPHONE (OUTPATIENT)
Dept: UROLOGY | Age: 65
End: 2025-03-06
Payer: COMMERCIAL

## 2025-03-06 ENCOUNTER — TELEPHONE (OUTPATIENT)
Dept: UROLOGY | Facility: CLINIC | Age: 65
End: 2025-03-06
Payer: COMMERCIAL

## 2025-03-06 NOTE — TELEPHONE ENCOUNTER
"PT CALLED BACK TO SCHEDULE CYSTO/I OFFERED PT 4/22/25 BR[facility] AND HE SAID THIS IS \"CRAP\". I ASKED HIM NOT TO SPEAK TO ME LIKE THAT AND HE HUNG UP. PLEASE CALL PT  "

## 2025-03-06 NOTE — TELEPHONE ENCOUNTER
----- Message from Renea Patel sent at 3/6/2025  9:13 AM EST -----  Le tpt know he has more blood in his urine. Would recommend that he proceed with ct and cysto

## 2025-03-06 NOTE — TELEPHONE ENCOUNTER
I spoke with the patient. He states he has everything scheduled and had no follow up questions at this moment.

## 2025-03-14 ENCOUNTER — HOSPITAL ENCOUNTER (OUTPATIENT)
Dept: CT IMAGING | Facility: HOSPITAL | Age: 65
Discharge: HOME | End: 2025-03-14
Payer: MEDICARE

## 2025-03-14 DIAGNOSIS — R31.29 MICROHEMATURIA: ICD-10-CM

## 2025-03-14 LAB
CREAT BLDA-MCNC: 1.6 MG/DL (ref 0.6–1.3)
EGFRCR SERPLBLD CKD-EPI 2021: 47.5 ML/MIN/1.73

## 2025-03-14 PROCEDURE — 82565 ASSAY OF CREATININE: CPT

## 2025-03-14 PROCEDURE — 74178 CT ABD&PLV WO CNTR FLWD CNTR: CPT

## 2025-03-14 PROCEDURE — 25510000001 IOPAMIDOL PER 1 ML: Performed by: NURSE PRACTITIONER

## 2025-03-14 RX ORDER — IOPAMIDOL 755 MG/ML
100 INJECTION, SOLUTION INTRAVASCULAR
Status: COMPLETED | OUTPATIENT
Start: 2025-03-14 | End: 2025-03-14

## 2025-03-14 RX ADMIN — IOPAMIDOL 100 ML: 755 INJECTION, SOLUTION INTRAVENOUS at 09:41

## 2025-04-23 ENCOUNTER — TELEPHONE (OUTPATIENT)
Dept: UROLOGY | Age: 65
End: 2025-04-23
Payer: COMMERCIAL

## 2025-04-23 NOTE — TELEPHONE ENCOUNTER
CALLED PT TO CX APPT 4/25 W/ [facility].    PER [facility], PT RECIEVEDD ABX FROM ER AND SHOULD BE OK TO F/U W/ [facility] ON 5/8.    SPOKE W/ PATIENT WHO STATED HE IS NOW SEEING DR. MADISON BANKS AND HE WANTS TO JUST CX ALL FUTURE APPT'S W/ OUR OFFICE.    INFORMED PT I WOULD LET [facility] KNOW.    CX'D APPT W/ [facility] AND CYSTO W/ [facility].    ANYTHING ELSE TO DO?

## 2025-04-24 NOTE — TELEPHONE ENCOUNTER
WILL CLOSE REFERRAL FROM ER IN [facility] TO [facility] FOR HYDROCELE. THERE IS STILL AN APPOINTMENT ON JUNE 11. THIS NEEDS TO BE CANCELLED ALSO, CORRECT? PLEASE CANCEL

## 2025-04-25 ENCOUNTER — TELEPHONE (OUTPATIENT)
Dept: UROLOGY | Age: 65
End: 2025-04-25

## 2025-04-25 NOTE — TELEPHONE ENCOUNTER
Chief complaint: PATIENT IS SEEING DIFFERENT UROLOGIST AND ASKED ALL APPOINTMENTS BE CANCELLED, PER PREVIOUS COMMUNICATION.    WILL CLOSE REFERRAL FROM ER IN [facility] TO [facility] FOR HYDROCELE. THERE IS STILL AN APPOINTMENT ON JUNE 11. THIS NEEDS TO BE CANCELLED ALSO, CORRECT? PLEASE CANCEL

## (undated) DEVICE — SOLIDIFIER LIQLOC PLS 1500CC BT

## (undated) DEVICE — PAD GRND REM POLYHESIVE A/ DISP

## (undated) DEVICE — SOL IRRG H2O PL/BG 1000ML STRL

## (undated) DEVICE — LINER SURG CANSTR SXN S/RIGD 1500CC

## (undated) DEVICE — Device

## (undated) DEVICE — CONN JET HYDRA H20 AUXILIARY DISP

## (undated) DEVICE — FIAPC® PROBE W/ FILTER 2200 A OD 2.3MM/6.9FR; L 2.2M/7.2FT: Brand: ERBE

## (undated) DEVICE — BLCK/BITE BLOX WO/DENTL/RIM W/STRAP 54F

## (undated) DEVICE — Device: Brand: DEFENDO AIR/WATER/SUCTION AND BIOPSY VALVE